# Patient Record
Sex: MALE | Race: OTHER | Employment: FULL TIME | URBAN - METROPOLITAN AREA
[De-identification: names, ages, dates, MRNs, and addresses within clinical notes are randomized per-mention and may not be internally consistent; named-entity substitution may affect disease eponyms.]

---

## 2017-01-04 ENCOUNTER — OFFICE VISIT (OUTPATIENT)
Dept: ORTHOPEDIC SURGERY | Age: 37
End: 2017-01-04

## 2017-01-04 VITALS
HEART RATE: 88 BPM | DIASTOLIC BLOOD PRESSURE: 80 MMHG | WEIGHT: 147.05 LBS | BODY MASS INDEX: 22.29 KG/M2 | SYSTOLIC BLOOD PRESSURE: 117 MMHG | HEIGHT: 68 IN

## 2017-01-04 DIAGNOSIS — Z98.890 S/P ACL RECONSTRUCTION: Primary | ICD-10-CM

## 2017-01-04 DIAGNOSIS — S83.511D RUPTURE OF ANTERIOR CRUCIATE LIGAMENT OF RIGHT KNEE, SUBSEQUENT ENCOUNTER: ICD-10-CM

## 2017-01-04 DIAGNOSIS — M54.89 BACK PAIN WITHOUT SCIATICA: ICD-10-CM

## 2017-01-04 PROCEDURE — 99024 POSTOP FOLLOW-UP VISIT: CPT | Performed by: ORTHOPAEDIC SURGERY

## 2017-01-06 ENCOUNTER — HOSPITAL ENCOUNTER (OUTPATIENT)
Dept: PHYSICAL THERAPY | Age: 37
Discharge: HOME | End: 2017-01-06
Admitting: ORTHOPAEDIC SURGERY

## 2017-01-18 ENCOUNTER — HOSPITAL ENCOUNTER (OUTPATIENT)
Dept: PHYSICAL THERAPY | Age: 37
Discharge: HOME | End: 2017-01-18
Admitting: ORTHOPAEDIC SURGERY

## 2017-01-25 ENCOUNTER — HOSPITAL ENCOUNTER (OUTPATIENT)
Dept: PHYSICAL THERAPY | Age: 37
Discharge: HOME | End: 2017-01-25
Admitting: ORTHOPAEDIC SURGERY

## 2017-03-21 ENCOUNTER — OFFICE VISIT (OUTPATIENT)
Dept: ORTHOPEDIC SURGERY | Age: 37
End: 2017-03-21

## 2017-03-21 VITALS
DIASTOLIC BLOOD PRESSURE: 86 MMHG | HEIGHT: 68 IN | BODY MASS INDEX: 22.29 KG/M2 | SYSTOLIC BLOOD PRESSURE: 138 MMHG | WEIGHT: 147.05 LBS | HEART RATE: 88 BPM

## 2017-03-21 DIAGNOSIS — S83.511D RUPTURE OF ANTERIOR CRUCIATE LIGAMENT OF RIGHT KNEE, SUBSEQUENT ENCOUNTER: Primary | ICD-10-CM

## 2017-03-21 PROCEDURE — 99213 OFFICE O/P EST LOW 20 MIN: CPT | Performed by: ORTHOPAEDIC SURGERY

## 2017-05-24 ENCOUNTER — OFFICE VISIT (OUTPATIENT)
Dept: ORTHOPEDIC SURGERY | Age: 37
End: 2017-05-24

## 2017-05-24 VITALS
WEIGHT: 147.05 LBS | DIASTOLIC BLOOD PRESSURE: 82 MMHG | HEIGHT: 68 IN | HEART RATE: 85 BPM | BODY MASS INDEX: 22.29 KG/M2 | SYSTOLIC BLOOD PRESSURE: 112 MMHG

## 2017-05-24 DIAGNOSIS — S83.511D RUPTURE OF ANTERIOR CRUCIATE LIGAMENT OF RIGHT KNEE, SUBSEQUENT ENCOUNTER: Primary | ICD-10-CM

## 2017-05-24 PROCEDURE — 73560 X-RAY EXAM OF KNEE 1 OR 2: CPT | Performed by: ORTHOPAEDIC SURGERY

## 2017-05-24 PROCEDURE — 99213 OFFICE O/P EST LOW 20 MIN: CPT | Performed by: ORTHOPAEDIC SURGERY

## 2017-08-23 ENCOUNTER — OFFICE VISIT (OUTPATIENT)
Dept: ORTHOPEDIC SURGERY | Age: 37
End: 2017-08-23

## 2017-08-23 VITALS
BODY MASS INDEX: 22.43 KG/M2 | WEIGHT: 148 LBS | DIASTOLIC BLOOD PRESSURE: 84 MMHG | HEART RATE: 97 BPM | SYSTOLIC BLOOD PRESSURE: 131 MMHG | HEIGHT: 68 IN

## 2017-08-23 DIAGNOSIS — S83.511D RUPTURE OF ANTERIOR CRUCIATE LIGAMENT OF RIGHT KNEE, SUBSEQUENT ENCOUNTER: ICD-10-CM

## 2017-08-23 DIAGNOSIS — Z98.890 S/P ACL RECONSTRUCTION: Primary | ICD-10-CM

## 2017-08-23 DIAGNOSIS — M17.10 PATELLOFEMORAL ARTHRITIS: ICD-10-CM

## 2017-08-23 PROCEDURE — 99213 OFFICE O/P EST LOW 20 MIN: CPT | Performed by: ORTHOPAEDIC SURGERY

## 2017-08-24 ENCOUNTER — HOSPITAL ENCOUNTER (OUTPATIENT)
Dept: PHYSICAL THERAPY | Age: 37
Discharge: HOME | End: 2017-08-24
Admitting: ORTHOPAEDIC SURGERY

## 2017-08-31 ENCOUNTER — HOSPITAL ENCOUNTER (OUTPATIENT)
Dept: PHYSICAL THERAPY | Age: 37
Discharge: HOME | End: 2017-08-31
Admitting: ORTHOPAEDIC SURGERY

## 2017-09-19 ENCOUNTER — HOSPITAL ENCOUNTER (OUTPATIENT)
Dept: PHYSICAL THERAPY | Age: 37
Discharge: HOME | End: 2017-09-19
Admitting: ORTHOPAEDIC SURGERY

## 2017-09-25 ENCOUNTER — HOSPITAL ENCOUNTER (OUTPATIENT)
Dept: PHYSICAL THERAPY | Age: 37
Discharge: HOME | End: 2017-09-25
Admitting: ORTHOPAEDIC SURGERY

## 2017-10-10 ENCOUNTER — HOSPITAL ENCOUNTER (OUTPATIENT)
Dept: PHYSICAL THERAPY | Age: 37
Discharge: HOME | End: 2017-10-10
Admitting: ORTHOPAEDIC SURGERY

## 2017-10-10 NOTE — FLOWSHEET NOTE
[address], [address] [place] [address]  Phone: [phone]   Fax:     [phone]        Physical Therapy Daily Treatment Note  Date:  10/10/2017    Patient Name:  Karlo Angulo    :  1980  MRN: [id number]  Restrictions/Precautions:    Medical/Treatment Diagnosis Information:  Diagnosis: S/P ACL reconstruction - Z98.890, Patellofemoral arthritis - M17.10, Rupture of ACL of R knee - S98.511D  Treatment Diagnosis: Decreased strength;Decreased endurance;Decreased high-level IADLs; Insurance/Certification information:  PT Insurance Information: [facility]  Physician Information:  Referring Practitioner: Dr. Garland Teixeira of care signed (Y/N): Y    Date of Patient follow up with Physician:     G-Code (if applicable):      Date G-Code Applied:  17  PT G-Codes  Functional Assessment Tool Used: LEFs  Score: 41% limitaiton  Functional Limitation: Mobility: Walking and moving around  Mobility: Walking and Moving Around Current Status (): At least 40 percent but less than 60 percent impaired, limited or restricted  Mobility: Walking and Moving Around Goal Status (): 0 percent impaired, limited or restricted     Progress Note: []  Yes  [x]  No  Next due by:        Latex Allergy:  [x]NO      []YES  Preferred Language for Healthcare:   [x]English       []other:    Visit # Insurance Allowable   5 UNL     Pain level: 0 /10 10/10    SUBJECTIVE:  He has started to do more workouts in the gym. He states he is doing better. He states his pain has been better when he does his exercises.    10/10     OBJECTIVE:    Observation:   Test measurements:          ROM AROM Comment     L R     Flexion 141 136     Extension 0 0         Strength L R Comment   Quad 4+/5 4-/5 + pain     Hamstring 4+/5 4+/5 + pain                Hip  flexion 4+/5 4+/5     Hip abd 4+/5 4/5                             Functional Mobility/Transfers: Independent      Posture: WFL     Gait: (include devices/WB status) WFL       RESTRICTIONS/PRECAUTIONS: None     Exercises/Interventions:   Exercise/Equipment Resistance/Repetitions Other comments   Bike Stretching     Hamstring 30 sec x 3 bilaterally  Added 9/19   Child's pose 10 sec x 10 Added 9/19   Inclined Calf     Hip Flexion     ITB     Groin     Quad          SLR     Supine 10 x3 #7.5 Started 8/24   Abduction 10 x3 #7.5 Started 8/24   Adduction 10 x3 #7.5 Started 8/24   Prone 10 x3 #7.5 Started 8/24   SLR+ 30 secx  5 #1. 5 Started 8/24   Clamshells 10 x 3 #7.5 ^9/19   Bridges            CKC     Calf raises     Wall sits     Step ups 10 x 3 L2 Added 9/19   1 leg stand     Squatting     CC TKE Balance     bridges     TRX squats  SL anterior heel taps 10 x 3 Added 9/25   Band walks  20' x 6 blue TB  ,Added 10/10        PRE     Extension  RANGE:   Flexion  RANGE:        Quantum machines     Leg press - SL  10 x 3 #80 ^10/10   Leg extension     Leg curl - SL  10 x 3 #35 Started 8/24        Manual interventions               Therapeutic Exercise and NMR EXR  [x] (27003) Provided verbal/tactile cueing for activities related to strengthening, flexibility, endurance, ROM for improvements in LE, proximal hip, and core control with self care, mobility, lifting, ambulation.  [] (31792) Provided verbal/tactile cueing for activities related to improving balance, coordination, kinesthetic sense, posture, motor skill, proprioception  to assist with LE, proximal hip, and core control in self care, mobility, lifting, ambulation and eccentric single leg control.      NMR and Therapeutic Activities:    [x] (12236 or 45989) Provided verbal/tactile cueing for activities related to improving balance, coordination, kinesthetic sense, posture, motor skill, proprioception and motor activation to allow for proper function of core, proximal hip and LE with self care and ADLs  [] (87784) Gait Re-education- Provided training and instruction to the patient for proper LE, core and proximal hip recruitment and positioning and eccentric body weight control with ambulation re-education including up and down stairs     Home Exercise Program:    [x] (06469) Reviewed/Progressed HEP activities related to strengthening, flexibility, endurance, ROM of core, proximal hip and LE for functional self-care, mobility, lifting and ambulation/stair navigation   [] (56221)Reviewed/Progressed HEP activities related to improving balance, coordination, kinesthetic sense, posture, motor skill, proprioception of core, proximal hip and LE for self care, mobility, lifting, and ambulation/stair navigation      Manual Treatments:  PROM / STM / Oscillations-Mobs:  G-I, II, III, IV (PA's, Inf., Post.)  [] (03105) Provided manual therapy to mobilize LE, proximal hip and/or LS spine soft tissue/joints for the purpose of modulating pain, promoting relaxation,  increasing ROM, reducing/eliminating soft tissue swelling/inflammation/restriction, improving soft tissue extensibility and allowing for proper ROM for normal function with self care, mobility, lifting and ambulation. Modalities: declined 9/19    Charges:  Timed Code Treatment Minutes: 45'    Total Treatment Minutes: 11:35 - 12:35 (60')        [] EVAL (LOW) 44085 (typically 20 minutes face-to-face)  [] EVAL (MOD) 10207 (typically 30 minutes face-to-face)  [] EVAL (HIGH) 34716 (typically 45 minutes face-to-face)  [] RE-EVAL   [x] EA(62782) x  1   [] IONTO  [x] NMR (85241) x  1   [] VASO  [] Manual (70943) x       [] Other:  [x] TA x  1    [] Mech Traction (51041)  [] ES(attended) (06184)      [] ES (un) (62387):     GOALS:  9/25  Patient stated goal: able to play soccer (rec) - Not MET    Therapist goals for Patient:   Short Term Goals: To be achieved in: 2 weeks  1. Independent in HEP and progression per patient tolerance, in order to prevent re-injury. - MET  2.  Patient will have a decrease in pain to facilitate improvement in movement, function, and ADLs as indicated by Functional Deficits. - MET    Long Term Goals: To be achieved in: 6-8 weeks  1. Pt will demonstrate greater than or equal to 50% improvement on LEFS to assist with reaching prior level of function. - Not MET  2. Patient will demonstrate increased right knee flex ROM to greater than or equal to 140 deg to allow for proper joint functioning as indicated by patients Functional Deficits. - Not MET  3. Patient will demonstrate an increase in right hip (flex and ABD) and knee (flex and ext) strength to 4+/5  to allow for proper functional mobility as indicated by patients Functional Deficits. -  Progressing towards  4. Patient will return to ambulating stairs without increased symptoms or restriction.  - Not MET  5. Patient will return to soccer pain free with proper form of cutting.  - Not MET    Progression Towards Functional goals:  [x] Patient is progressing as expected towards functional goals listed. [] Progression is slowed due to complexities listed. [] Progression has been slowed due to co-morbidities. [] Plan just implemented, too soon to assess goals progression  [] Other:     ASSESSMENT:  Pt sara session well. He continues to demonstrate quad fatigue, noted by shakiness with exercises. He sara the addition of band walks, reporting muscular fatigue. 10/10    Treatment/Activity Tolerance:  [x] Patient tolerated treatment well [] Patient limited by fatique  [] Patient limited by pain  [] Patient limited by other medical complications  [] Other:     Patient education: Patient education on PT and plan of care including diagnosis, prognosis, treatment goals and options. Patient agrees with discussed POC and treatment and is aware of rehab process.  Pt was also educated on clinic layout and use of modalities 8/24    Prognosis: [x] Good [] Fair  [] Poor    Patient Requires Follow-up: [x] Yes  [] No    PLAN: 1x/ week for 4 - 6 weeks (9/25 -

## 2017-10-25 ENCOUNTER — OFFICE VISIT (OUTPATIENT)
Dept: ORTHOPEDIC SURGERY | Age: 37
End: 2017-10-25

## 2017-10-25 VITALS
SYSTOLIC BLOOD PRESSURE: 133 MMHG | BODY MASS INDEX: 22.28 KG/M2 | DIASTOLIC BLOOD PRESSURE: 82 MMHG | WEIGHT: 147 LBS | HEART RATE: 82 BPM | HEIGHT: 68 IN

## 2017-10-25 DIAGNOSIS — Z98.890 S/P ACL RECONSTRUCTION: ICD-10-CM

## 2017-10-25 DIAGNOSIS — S83.511D RUPTURE OF ANTERIOR CRUCIATE LIGAMENT OF RIGHT KNEE, SUBSEQUENT ENCOUNTER: Primary | ICD-10-CM

## 2017-10-25 PROCEDURE — G8420 CALC BMI NORM PARAMETERS: HCPCS | Performed by: ORTHOPAEDIC SURGERY

## 2017-10-25 PROCEDURE — 99213 OFFICE O/P EST LOW 20 MIN: CPT | Performed by: ORTHOPAEDIC SURGERY

## 2017-10-25 PROCEDURE — G8427 DOCREV CUR MEDS BY ELIG CLIN: HCPCS | Performed by: ORTHOPAEDIC SURGERY

## 2017-10-25 PROCEDURE — 1036F TOBACCO NON-USER: CPT | Performed by: ORTHOPAEDIC SURGERY

## 2017-10-25 PROCEDURE — G8484 FLU IMMUNIZE NO ADMIN: HCPCS | Performed by: ORTHOPAEDIC SURGERY

## 2017-10-25 ASSESSMENT — ENCOUNTER SYMPTOMS: BACK PAIN: 1

## 2017-10-25 NOTE — PROGRESS NOTES
objective assessment of his strength with a Biodex, and then see where he is at so we can make some specific recommendations on how to proceed. We did not think he is quite ready for any sport activities like soccer with cutting and pivoting, especially because he does not feel comfortable with this. We will see him back after the completion of the bBiodex      France Marie MD  Fellow - [address] [facility]  [phone]

## 2017-10-25 NOTE — PROGRESS NOTES
Review of Systems   Cardiovascular: Positive for palpitations. Musculoskeletal: Positive for back pain and joint pain.         R knee pain

## 2017-10-26 NOTE — PROGRESS NOTES
Chief complaint: Leg weakness    HPI:Almost 11 months status post right knee ACL reconstruction with allograft. He is still working on physical therapy one time every 2 weeks. He states he is going to the gym and working on his quadriceps strength to her 3 times weekly. He feels like his strength is slowly coming along but not at the point that he would want to buy now. He denies any feelings of instability. He denies any significant pain. He is continuing to get workup for his known arrhythmias, but presented approximately 2 months postoperatively. This significantly interfered with his rehab early on, it has set him behind ball. Patient's review of systems was reviewed with him today. Only changes are that he has had recent cardiac arrhythmias which are being treated. Pain Assessment  Location of Pain: Knee  Location Modifiers: Right  Severity of Pain: 0  Result of Injury: Yes  Work-Related Injury: No  Are there other pain locations you wish to document?: No    Past Medical History:   Diagnosis Date    Anterior cruciate ligament tear     right    Palpitations         Past Surgical History:   Procedure Laterality Date    KNEE SURGERY Right 11/10/2016    RIGHT KNEE ARTHROSCOPIC ANTERIOR CRUCIATE LIGAMENT       History reviewed. No pertinent family history.     Social History     Social History    Marital status:      Spouse name: N/A    Number of children: N/A    Years of education: N/A     Social History Main Topics    Smoking status: Never Smoker    Smokeless tobacco: Never Used    Alcohol use 0.6 oz/week     1 Shots of liquor per week    Drug use: No    Sexual activity: Not Asked     Other Topics Concern    None     Social History Narrative    None       Current Outpatient Prescriptions   Medication Sig Dispense Refill    diltiazem (CARDIZEM) 30 MG tablet TAKE 1 TABLET BY MOUTH EVERY DAY AS NEEDED  3    diclofenac (VOLTAREN) 75 MG EC tablet Take 1 tablet by mouth 2 times daily 1 PO Q BID WITH FOOD 60 tablet 2    oxyCODONE-acetaminophen (PERCOCET) 5-325 MG per tablet        No current facility-administered medications for this visit. Allergies   Allergen Reactions    Sulfa Antibiotics        Vital signs:  /82   Pulse 82   Ht 5' 8\" (1.727 m)   Wt 147 lb (66.7 kg)   BMI 22.35 kg/m²        Neuro: Alert & oriented x 3,  normal,  no focal deficits noted. Normal affect. Eyes: sclera clear  Ears: Normal external ear  Mouth:  No perioral lesions  Pulm: Respirations unlabored and regular  Pulse: Regular rate and rhythm   Skin: Warm, well perfused        Left Knee Exam:       Gait/Alignment: No use of assistive devices. No antalgic gait or limp. Normal alignment. Patella tracking: Normal tracking identified. No retinacular tenderness. Negative J sign. Inspection/Skin: Skin is intact without erythema, ecchymosis, or swelling. Effusion; None. Palpation:  Nontender. No crepitus. Range of Motion:   0° of extension to 125° of flexion. Strength: 5/5 quadriceps strength. Ligamentous Stability: Stable to valgus and varus testing at 0° and 30°. Negative Lachman exam.  Negative posterior drawer. Neurologic and vascular: Skin is warm and well-perfused. Additional findings: Calf soft nontender             Right knee exam shows normal alignment. He has regained complete range of motion compared to contralateral side. He is nontender throughout. He has no significant patellofemoral crepitance and no pain with patellofemoral compression. His knee is stable to AP and varus and valgus stresses. He has notable quadriceps atrophy. He has 45 strength compared to contralateral side. His hamstring strength is closer to that on the left than his extension strength. Impression: Right knee ACL reconstruction with allograft, with routine healing, but delayed shrink the recovery    Plan: The patient has a lot of strength still to regain.   We are going to get an objective assessment of his strength with a Biodex, and then see where he is at so we can make some specific recommendations on how to proceed. We did not think he is quite ready for any sport activities like soccer with cutting and pivoting, especially because he does not feel comfortable with this. We will see him back after the completion of the bBiodex      Mihaela Watkins MD  Fellow - [address] [facility]  [phone]            I supervised my sports medicine fellow in the evaluation and development of a treatment plan  for this patient. I personally interviewed the patient and performed a physical examination. In addition, I discussed the patient's condition and treatment options with them. All of their questions were answered. I personally reviewed the patient's pain scale, review of systems, family history, social history, past medical history, allergies and medications. 13 point review of systems was collected today and is filed in the medical record. Greater than 50% of the visit was spent counseling the patient. Judi Loving MD  Sports Medicine, Arthroscopic Knee and Shoulder Surgery    This dictation was performed with a verbal recognition program [facility]) and it was checked for errors. It is possible that there are still dictated errors within this office note. If so, please bring any errors to my attention for an addendum. All efforts were made to ensure that this office note is accurate.

## 2017-10-27 ENCOUNTER — OFFICE VISIT (OUTPATIENT)
Dept: ORTHOPEDIC SURGERY | Age: 37
End: 2017-10-27

## 2017-10-27 ENCOUNTER — HOSPITAL ENCOUNTER (OUTPATIENT)
Dept: PHYSICAL THERAPY | Age: 37
Discharge: HOME | End: 2017-10-27
Admitting: ORTHOPAEDIC SURGERY

## 2017-10-27 VITALS
SYSTOLIC BLOOD PRESSURE: 124 MMHG | WEIGHT: 147 LBS | BODY MASS INDEX: 22.28 KG/M2 | HEIGHT: 68 IN | DIASTOLIC BLOOD PRESSURE: 79 MMHG | HEART RATE: 100 BPM

## 2017-10-27 DIAGNOSIS — Z98.890 S/P ACL RECONSTRUCTION: Primary | ICD-10-CM

## 2017-10-27 PROCEDURE — G8484 FLU IMMUNIZE NO ADMIN: HCPCS | Performed by: ORTHOPAEDIC SURGERY

## 2017-10-27 PROCEDURE — 99213 OFFICE O/P EST LOW 20 MIN: CPT | Performed by: ORTHOPAEDIC SURGERY

## 2017-10-27 PROCEDURE — 1036F TOBACCO NON-USER: CPT | Performed by: ORTHOPAEDIC SURGERY

## 2017-10-27 PROCEDURE — G8420 CALC BMI NORM PARAMETERS: HCPCS | Performed by: ORTHOPAEDIC SURGERY

## 2017-10-27 PROCEDURE — G8427 DOCREV CUR MEDS BY ELIG CLIN: HCPCS | Performed by: ORTHOPAEDIC SURGERY

## 2017-10-27 NOTE — PROGRESS NOTES
Review of Systems   Cardiovascular: Positive for palpitations. Musculoskeletal: Positive for joint pain.         R knee pain

## 2017-11-01 ENCOUNTER — HOSPITAL ENCOUNTER (OUTPATIENT)
Dept: PHYSICAL THERAPY | Age: 37
Discharge: HOME | End: 2017-11-30
Attending: ORTHOPAEDIC SURGERY | Admitting: ORTHOPAEDIC SURGERY

## 2017-11-03 ENCOUNTER — HOSPITAL ENCOUNTER (OUTPATIENT)
Dept: PHYSICAL THERAPY | Age: 37
Discharge: HOME | End: 2017-11-03
Admitting: ORTHOPAEDIC SURGERY

## 2017-11-03 NOTE — FLOWSHEET NOTE
instruction to the patient for proper LE, core and proximal hip recruitment and positioning and eccentric body weight control with ambulation re-education including up and down stairs     Home Exercise Program:    [x] (33639) Reviewed/Progressed HEP activities related to strengthening, flexibility, endurance, ROM of core, proximal hip and LE for functional self-care, mobility, lifting and ambulation/stair navigation   [] (37917)Reviewed/Progressed HEP activities related to improving balance, coordination, kinesthetic sense, posture, motor skill, proprioception of core, proximal hip and LE for self care, mobility, lifting, and ambulation/stair navigation      Manual Treatments:  PROM / STM / Oscillations-Mobs:  G-I, II, III, IV (PA's, Inf., Post.)  [] (90515) Provided manual therapy to mobilize LE, proximal hip and/or LS spine soft tissue/joints for the purpose of modulating pain, promoting relaxation,  increasing ROM, reducing/eliminating soft tissue swelling/inflammation/restriction, improving soft tissue extensibility and allowing for proper ROM for normal function with self care, mobility, lifting and ambulation. Modalities: declined 9/19    Charges:  Timed Code Treatment Minutes: 45'    Total Treatment Minutes: 11:50 - 12:50 (60')        [] EVAL (LOW) 35215 (typically 20 minutes face-to-face)  [] EVAL (MOD) 85288 (typically 30 minutes face-to-face)  [] EVAL (HIGH) 95251 (typically 45 minutes face-to-face)  [] RE-EVAL   [x] JF(62849) x  1   [] IONTO  [x] NMR (69559) x  1   [] VASO  [] Manual (02088) x       [] Other:  [x] TA x  1    [] Mech Traction (42970)  [] ES(attended) (64005)      [] ES (un) (69796):     GOALS:  9/25  Patient stated goal: able to play soccer (rec) - Not MET    Therapist goals for Patient:   Short Term Goals: To be achieved in: 2 weeks  1. Independent in HEP and progression per patient tolerance, in order to prevent re-injury. - MET  2.  Patient will have a decrease in pain to facilitate improvement in movement, function, and ADLs as indicated by Functional Deficits. - MET    Long Term Goals: To be achieved in: 6-8 weeks  1. Pt will demonstrate greater than or equal to 50% improvement on LEFS to assist with reaching prior level of function. - Not MET  2. Patient will demonstrate increased right knee flex ROM to greater than or equal to 140 deg to allow for proper joint functioning as indicated by patients Functional Deficits. - Not MET  3. Patient will demonstrate an increase in right hip (flex and ABD) and knee (flex and ext) strength to 4+/5  to allow for proper functional mobility as indicated by patients Functional Deficits. -  Progressing towards  4. Patient will return to ambulating stairs without increased symptoms or restriction.  - Not MET  5. Patient will return to soccer pain free with proper form of cutting.  - Not MET    Progression Towards Functional goals:  [x] Patient is progressing as expected towards functional goals listed. [] Progression is slowed due to complexities listed. [] Progression has been slowed due to co-morbidities. [] Plan just implemented, too soon to assess goals progression  [] Other:     ASSESSMENT:  Pt sara session well. He demonstrates extreme quad weakness and atrophy. The patient lacks eccentric quad control and was not able to perform SL dips without B UE support after doing eccentric knee flexion. The patient required verbal cues for lunges and was not able to perform with R LE in back foot. Recommend to perform functional exercises prior to PRE workout. 11/3    Treatment/Activity Tolerance:  [x] Patient tolerated treatment well [] Patient limited by fatique  [] Patient limited by pain  [] Patient limited by other medical complications  [] Other:     Patient education: Patient education on PT and plan of care including diagnosis, prognosis, treatment goals and options. Patient agrees with discussed POC and treatment and is aware of rehab process.  Pt

## 2017-12-01 ENCOUNTER — HOSPITAL ENCOUNTER (OUTPATIENT)
Dept: PHYSICAL THERAPY | Age: 37
Discharge: HOME | End: 2017-12-31
Attending: ORTHOPAEDIC SURGERY | Admitting: ORTHOPAEDIC SURGERY

## 2017-12-05 ENCOUNTER — HOSPITAL ENCOUNTER (OUTPATIENT)
Dept: PHYSICAL THERAPY | Age: 37
Discharge: HOME | End: 2017-12-05
Admitting: ORTHOPAEDIC SURGERY

## 2017-12-12 ENCOUNTER — HOSPITAL ENCOUNTER (OUTPATIENT)
Dept: PHYSICAL THERAPY | Age: 37
Discharge: HOME | End: 2017-12-12
Admitting: ORTHOPAEDIC SURGERY

## 2017-12-12 NOTE — FLOWSHEET NOTE
degrees      Assessed, but muscle tightness    [x]no symptoms distal to the knee    [x]no red flags and minimal yellow flags    [x]no contraindications to manipulation             ROM AROM Comment     L R     Flexion 141 138     Extension 0 0         Strength L R Comment   Quad 4+/5 4+/5     Hamstring 4+/5 4+/5                 Hip  flexion 4+/5 4+/5     Hip abd 4+/5 4+/5                             Functional Mobility/Transfers: Squatting form only allowed pt to reach about 45 deg of hip flexion. Pt exhibited decreased weight acceptance on R LE and increased knee valgus. Transfers are independent.      Posture: WFL     Gait: (include devices/WB status) WFL       RESTRICTIONS/PRECAUTIONS: None     Exercises/Interventions:   Exercise/Equipment Resistance/Repetitions Other comments   Bike 5' Stretching     Hamstring 30 sec x 3 bilaterally  Added 9/19   Child's pose  Added 9/19   Inclined Calf 10 x 10 sec Added 11/3   Hip Flexion     ITB     Groin     Quad          SLR     Supine ^ 11/3   Abduction ^ 11/3   Adduction ^ 11/3   Prone Started 8/24   Started 8/24   Clamshells  ^ 11/3   Bridges            CKC     Calf raises     Marie Heidi sits     Step ups 10 x 3 L3 lateral ^ 11/3   1 leg stand     Squatting     CC TKE Balance     bridges     TRX squats  10 x 3SL anterior heel taps 10 x 3 TRX Added 9/25   Band walks   ^ 11/3   Lunges  Added 11/3   PRE     Extension  RANGE:   Flexion  RANGE:        Quantum machines     Leg press - SL  10 x 3 #80 ^10/10   Leg extension 10 x 3 #20 BL   10 x 3 #35 BL conc. SL ecc.    Added 11/3   Leg curl - SL  10 x 3 #55 ^ 11/3        Manual interventions               Therapeutic Exercise and NMR EXR  [x] (23404) Provided verbal/tactile cueing for activities related to strengthening, flexibility, endurance, ROM for improvements in LE, proximal hip, and core control with self care, mobility, lifting, ambulation.  [] (90942) Provided verbal/tactile cueing for activities related to improving balance, EVAL (MOD) 14342 (typically 30 minutes face-to-face)  [] EVAL (HIGH) 60809 (typically 45 minutes face-to-face)  [] RE-EVAL   [x] JV(41400) x  1   [] IONTO  [x] NMR (50199) x  1   [] VASO  [x] Manual (04041) x  1    [] Other:  [] TA x       [] Mech Traction (80787)  [] ES(attended) (69715)      [] ES (un) (00950):     GOALS:  12/5  Patient stated goal: able to play soccer (rec) - Not MET    Therapist goals for Patient:   Short Term Goals: To be achieved in: 2 weeks  1. Independent in HEP and progression per patient tolerance, in order to prevent re-injury. - MET  2. Patient will have a decrease in pain to facilitate improvement in movement, function, and ADLs as indicated by Functional Deficits. - MET    Long Term Goals: To be achieved in: 6-8 weeks  1. Pt will demonstrate greater than or equal to 50% improvement on LEFS to assist with reaching prior level of function. - Not MET  2. Patient will demonstrate increased right knee flex ROM to greater than or equal to 140 deg to allow for proper joint functioning as indicated by patients Functional Deficits. - Not MET  3. Patient will demonstrate an increase in right hip (flex and ABD) and knee (flex and ext) strength to 4+/5  to allow for proper functional mobility as indicated by patients Functional Deficits. -  Met  4. Patient will return to ambulating stairs without increased symptoms or restriction.  - Not MET  5. Patient will return to soccer pain free with proper form of cutting.  - Not MET    Progression Towards Functional goals:  [x] Patient is progressing as expected towards functional goals listed. [] Progression is slowed due to complexities listed. [] Progression has been slowed due to co-morbidities. [] Plan just implemented, too soon to assess goals progression  [] Other:     ASSESSMENT:   The patients back pain was assessed and criteria was met for lumbar mobilization category.  After assessment and treatment of the back the patient was able to

## 2017-12-15 ENCOUNTER — HOSPITAL ENCOUNTER (OUTPATIENT)
Dept: PHYSICAL THERAPY | Age: 37
Discharge: HOME | End: 2017-12-31
Admitting: INTERNAL MEDICINE

## 2017-12-15 NOTE — FLOWSHEET NOTE
lay in a normal bed    Therapist goals for Patient:   Short Term Goals: To be achieved in: 2 weeks  1. Independent in HEP and progression per patient tolerance, in order to prevent re-injury. 2. Patient will have a decrease in pain to facilitate improvement in movement, function, and ADLs as indicated by Functional Deficits. Long Term Goals: To be achieved in: 4-6 weeks  1. Disability index score of 20% or less for the [place] to assist with reaching prior level of function in 6 weeks. 2. Patient will demonstrate increased AROM to WNL, good LS mobility, good hip ROM to allow for proper joint functioning as indicated by patients Functional Deficits in 4 weeks. 3. Patient will demonstrate an increase in Strength to good proximal hip and core activation to allow for proper functional mobility as indicated by patients Functional Deficits in 6 weeks. 4. Patient will return to independent functional activities without increased symptoms or restriction in 6 weeks. Progression Towards Functional goals:  [] Patient is progressing as expected towards functional goals listed. [] Progression is slowed due to complexities listed. [] Progression has been slowed due to co-morbidities.   [x] Plan just implemented, too soon to assess goals progression  [] Other:     ASSESSMENT:  See eval    Treatment/Activity Tolerance:  [x] Patient tolerated treatment well [] Patient limited by fatique  [] Patient limited by pain  [] Patient limited by other medical complications  [x] Other: 12/15 Good tolerance to manual. Reported feeling better after manipulation    Patient education:  12/15 HEP provided and reviewed with patient     Prognosis: [x] Good [] Fair  [] Poor    Patient Requires Follow-up: [x] Yes  [] No    PLAN: See eval  [] Continue per plan of care [] Alter current plan (see comments)  [x] Plan of care initiated [] Hold pending MD visit [] Discharge    Electronically signed by: Day Castrejon PT, DPT

## 2017-12-15 NOTE — PLAN OF CARE
other red flags. Relevant Medical History: R ACL surgery Nov 10, 2016  Functional Disability Index/G-Codes:  PT G-Codes  Functional Assessment Tool Used: Shalini  Score: 52%  Functional Limitation: Changing and maintaining body position  Changing and Maintaining Body Position Current Status (): At least 40 percent but less than 60 percent impaired, limited or restricted  Changing and Maintaining Body Position Goal Status ():  At least 1 percent but less than 20 percent impaired, limited or restricted    Pain Scale: 0-6/10  Easing factors: sleeping on left side; manipulation  Provocative factors: lifting and carrying heavy objects; sleeping on right side; sleeping on soft surface; sitting for long periods of time; bending over for extended period of time    Type: [x]Constant   []Intermittent  []Radiating []Localized []other:     Numbness/Tingling: Denies    Occupation/School:     Living Status/Prior Level of Function: Independent with ADLs and IADLs; playing soccer    OBJECTIVE:   ROM 12/15  Comments   Trunk flexion Mid Tibia bilateral    Trunk extension WNL Pain present   Trunk R sidebend WNL    Trunk L sidebend WNL    Trunk R rotation WNL    Trunk L rotation WNL    HS flexibility 20 degrees from vertical bilaterally                Strength  12/15 Left Right Comments   Hip flexion(L2) 5 5    Knee extension(L3) 5 5    Knee flexion(S1-2) 5 5    Ankle dorsiflexion(L4) 5 5    Toe extension(L5) 5 5    Ankle eversion/plantar flexion(S1) 5 5      Special tests  12/15  Comments   SLR     Slump test     Pelvic symmetry  Equal standing and supine    Segmental Spinal mobility     Heel walk negative    Toe walk negative    Tandem walk negative    Forward flexion Test negative    Prone flexion R higher than L                DTRs  12/15 Left Right Comments   Patellar(L3-L4) 2+ 2+    Achilles(S1-S2) 2+ 2+            Joint mobility: 12/15   [x]Normal    []Hypo   []Hyper    Palpation: Tenderness L PSIS 12/15    Functional Mobility/Transfers: Independent with increased pain; unable to work out due to pain  12/15     Posture: Forward head and rounded shoulders  12/15    Gait: (include devices/WB status) WNL  12/15                       [x] Patient history, allergies, meds reviewed. Medical chart reviewed. See intake form. 12/15    Review Of Systems (ROS):  [x]Performed Review of systems (Integumentary, CardioPulmonary, Neurological) by intake and observation. Intake form has been scanned into medical record. Patient has been instructed to contact their primary care physician regarding ROS issues if not already being addressed at this time.  12/15     Co-morbidities/Complexities (which will affect course of rehabilitation):   [x]None           Arthritic conditions   []Rheumatoid arthritis (M05.9)  []Osteoarthritis (M19.91)   Cardiovascular conditions   []Hypertension (I10)  []Hyperlipidemia (E78.5)  []Angina pectoris (I20)  []Atherosclerosis (I70)   Musculoskeletal conditions   []Disc pathology   []Congenital spine pathologies   []Prior surgical intervention  []Osteoporosis (M81.8)  []Osteopenia (M85.8)   Endocrine conditions   []Hypothyroid (E03.9)  []Hyperthyroid Gastrointestinal conditions   []Constipation (L16.72)   Metabolic conditions   []Morbid obesity (E66.01)  []Diabetes type 1(E10.65) or 2 (E11.65)   []Neuropathy (G60.9)     Pulmonary conditions   []Asthma (J45)  []Coughing   []COPD (J44.9)   Psychological Disorders  []Anxiety (F41.9)  []Depression (F32.9)   []Other:   []Other:           Barriers to/and or personal factors that will affect rehab potential:              [x]Age  [x]Sex              [x]Motivation/Lack of Motivation                        []Co-Morbidities              []Cognitive Function, education/learning barriers              []Environmental, home barriers              []profession/work barriers  [x]past PT/medical experience  []other:  Justification: Patient is a healthy 44year old male tasks   []Reduced ability to squat   []Reduced ability to forward bend   [x]Reduced ability to ambulate prolonged functional periods/distances/surfaces   []Reduced ability to ascend/descend stairs   []other:       Participation Restrictions   []Reduced participation in self care activities   [x]Reduced participation in home management activities   [x]Reduced participation in work activities   []Reduced participation in social activities. [x]Reduced participation in sport/recreational activities. Classification:   []Signs/symptoms consistent with Lumbar instability/stabilization subgroup. [x]Signs/symptoms consistent with Lumbar mobilization/manipulation subgroup, myotomes and dermatomes intact. Meets manipulation criteria. []Signs/symptoms consistent with Lumbar direction specific/centralization subgroup   []Signs/symptoms consistent with Lumbar traction subgroup     []Signs/symptoms consistent with lumbar facet dysfunction   []Signs/symptoms consistent with lumbar stenosis type dysfunction   []Signs/symptoms consistent with nerve root involvement including myotome & dermatome dysfunction   []Signs/symptoms consistent with post-surgical status including: decreased ROM, strength and function.    []signs/symptoms consistent with pathology which may benefit from Dry needling     []other:      Prognosis/Rehab Potential:      []Excellent   [x]Good    []Fair   []Poor    Tolerance of evaluation/treatment:    []Excellent   [x]Good    []Fair   []Poor    Physical Therapy Evaluation Complexity Justification  [x] A history of present problem with:  [x] no personal factors and/or comorbidities that impact the plan of care;  []1-2 personal factors and/or comorbidities that impact the plan of care  []3 personal factors and/or comorbidities that impact the plan of care  [x] An examination of body systems using standardized tests and measures addressing any of the following: body structures and functions (impairments),

## 2017-12-19 ENCOUNTER — HOSPITAL ENCOUNTER (OUTPATIENT)
Dept: PHYSICAL THERAPY | Age: 37
Discharge: HOME | End: 2017-12-19
Admitting: ORTHOPAEDIC SURGERY

## 2017-12-19 NOTE — FLOWSHEET NOTE
cueing for activities related to improving balance, coordination, kinesthetic sense, posture, motor skill, proprioception  to assist with LE, proximal hip, and core control in self care, mobility, lifting, ambulation and eccentric single leg control. NMR and Therapeutic Activities:    [x] (57565 or 41632) Provided verbal/tactile cueing for activities related to improving balance, coordination, kinesthetic sense, posture, motor skill, proprioception and motor activation to allow for proper function of core, proximal hip and LE with self care and ADLs  [] (27417) Gait Re-education- Provided training and instruction to the patient for proper LE, core and proximal hip recruitment and positioning and eccentric body weight control with ambulation re-education including up and down stairs     Home Exercise Program:    [x] (60984) Reviewed/Progressed HEP activities related to strengthening, flexibility, endurance, ROM of core, proximal hip and LE for functional self-care, mobility, lifting and ambulation/stair navigation   [] (59670)Reviewed/Progressed HEP activities related to improving balance, coordination, kinesthetic sense, posture, motor skill, proprioception of core, proximal hip and LE for self care, mobility, lifting, and ambulation/stair navigation      Manual Treatments:  PROM / STM / Oscillations-Mobs:  G-I, II, III, IV (PA's, Inf., Post.)  [x] (72992) Provided manual therapy to mobilize LE, proximal hip and/or LS spine soft tissue/joints for the purpose of modulating pain, promoting relaxation,  increasing ROM, reducing/eliminating soft tissue swelling/inflammation/restriction, improving soft tissue extensibility and allowing for proper ROM for normal function with self care, mobility, lifting and ambulation.      2     Modalities:  12/5    Charges:  Timed Code Treatment Minutes: 54'    Total Treatment Minutes: 11:30 - 12:34 (64')        [] EVAL (LOW) 63174 (typically 20 minutes face-to-face)  [] EVAL (MOD)

## 2017-12-20 ENCOUNTER — HOSPITAL ENCOUNTER (OUTPATIENT)
Dept: PHYSICAL THERAPY | Age: 37
Discharge: HOME | End: 2017-12-20
Admitting: INTERNAL MEDICINE

## 2017-12-20 NOTE — FLOWSHEET NOTE
Independent with increased pain; unable to work out due to pain  12/15     Posture: Forward head and rounded shoulders  12/15    Gait: (include devices/WB status) WNL  12/15                       [x] Patient history, allergies, meds reviewed. Medical chart reviewed. See intake form. 12/15    RESTRICTIONS/PRECAUTIONS: R ACL Repair 2016    Exercises/Interventions: BILATERAL  ROM/stretches     SKTC     DKTC 30 sec x 5 Start 12/15   Prayer stretch     Supine HS 30 sec x 5 Start 12/15   Pelvic tilt     Piriformis (supine) 30 sec x 5 Start 12/20   Hook lying rotation 10 sec x 10 Start 12/15   Cat and camel 3 x 10 Start 12/20   Thoracic extension 20x Start 12/15   Strengthening     SLR     Quadruped alternate UE reaches 3 x 30 sec Start 12/20   Quadruped alternate LE reaches 3 x 30 sec Start 12/20   Quadruped alternate UE/LE reaches     yavalu heel raises     Sit ups      planks     Tband lat pulls     Tband rows         Manual Intervention             Prone PA      GISTM/STM      Lumbar Manip Rotation 5 min Start 12/15   SI Manip      Hip belt mobs      Hip LA distraction (L  Manip)  5 min Start 12/15           Therapeutic Exercise and NMR EXR  [x] (22278) Provided verbal/tactile cueing for activities related to strengthening, flexibility, endurance, ROM  for improvements in proximal hip and core control with self care, mobility, lifting and ambulation.  [] (14623) Provided verbal/tactile cueing for activities related to improving balance, coordination, kinesthetic sense, posture, motor skill, proprioception  to assist with core control in self care, mobility, lifting, and ambulation.      Therapeutic Activities:    [] (71761 or 89090) Provided verbal/tactile cueing for activities related to improving balance, coordination, kinesthetic sense, posture, motor skill, proprioception and motor activation to allow for proper function  with self care and ADLs  [] (03294) Provided training and instruction to the patient for proper core and proximal hip recruitment and positioning with ambulation re-education     Home Exercise Program:    [x] (18066) Reviewed/Progressed HEP activities related to strengthening, flexibility, endurance, ROM of core, proximal hip and LE for functional self-care, mobility, lifting and ambulation   [] (97150) Reviewed/Progressed HEP activities related to improving balance, coordination, kinesthetic sense, posture, motor skill, proprioception of core, proximal hip and LE for self care, mobility, lifting, and ambulation      Manual Treatments:  PROM / STM / Oscillations-Mobs:  G-I, II, III, IV (PA's, Inf., Post.)  [x] (74506) Provided manual therapy to mobilize proximal hip and LS spine soft tissue/joints for the purpose of modulating pain, promoting relaxation,  increasing ROM, reducing/eliminating soft tissue swelling/inflammation/restriction, improving soft tissue extensibility and allowing for proper ROM for normal function with self care, mobility, lifting and ambulation. Modalities:       Charges:  Timed Code Treatment Minutes: 45   Total Treatment Minutes: 55       [] EVAL (LOW) 70399 (typically 20 minutes face-to-face)  [x] EVAL (MOD) 44002 (typically 30 minutes face-to-face)  [] EVAL (HIGH) 16435 (typically 45 minutes face-to-face)  [] RE-EVAL     [x] OO(44723) x  2   [] IONTO  [] NMR (22531) x      [] VASO  [x] Manual (80794) x  1    [] Other:  [] TA x       [] Mech Traction (34046)  [] ES(attended) (87584)      [] ES (un) (11020):     Goals:  Patient stated goal: Reduce pain; Be able to sit and walk for long periods of time; lay in a normal bed    Therapist goals for Patient:   Short Term Goals: To be achieved in: 2 weeks  1. Independent in HEP and progression per patient tolerance, in order to prevent re-injury. 2. Patient will have a decrease in pain to facilitate improvement in movement, function, and ADLs as indicated by Functional Deficits. Long Term Goals:  To be achieved

## 2017-12-27 ENCOUNTER — HOSPITAL ENCOUNTER (OUTPATIENT)
Dept: PHYSICAL THERAPY | Age: 37
Discharge: HOME | End: 2017-12-27
Admitting: ORTHOPAEDIC SURGERY

## 2017-12-27 NOTE — FLOWSHEET NOTE
[facility], [facility] [address] [place] [address]  Phone: [phone]   Fax:     [phone]    Physical Therapy Daily Treatment Note  Date:  2017    Patient Name:  Josefina Tinajero    :  1980  MRN: [id number]  Restrictions/Precautions:    Medical/Treatment Diagnosis Information:  Diagnosis: M46.1- Sacroiliitis  Treatment Diagnosis: M45.5 Low Back Pain  Insurance/Certification information:  PT Insurance Information: [facility]  Physician Information:  Referring Practitioner: Selena Man MD  Plan of care signed (Y/N):     Date of Patient follow up with Physician:     G-Code (if applicable):      Date G-Code Applied: 12/15/17  [place]: 52%        Progress Note: []  Yes  []  No  Next due by: Visit #10  Or 1/15/18    Latex Allergy:  [x]NO      []YES  Preferred Language for Healthcare:   [x]English       []other:    Visit # Insurance Allowable   3  12/27 UNL     Pain level: 3/10 12/27    SUBJECTIVE:   Patient states that his back is bothering him less but he is having symptoms up higher in more of his thoracic area. He states that he is more stiff in the morning.      OBJECTIVE:   ROM 12/15  Comments   Trunk flexion Mid Tibia bilateral    Trunk extension WNL Pain present   Trunk R sidebend WNL    Trunk L sidebend WNL    Trunk R rotation WNL    Trunk L rotation WNL    HS flexibility 20 degrees from vertical bilaterally                Strength  12/15 Left Right Comments   Hip flexion(L2) 5 5    Knee extension(L3) 5 5    Knee flexion(S1-2) 5 5    Ankle dorsiflexion(L4) 5 5    Toe extension(L5) 5 5    Ankle eversion/plantar flexion(S1) 5 5      Special tests  /15  Comments   SLR     Slump test     Pelvic symmetry  Equal standing and supine    Segmental Spinal mobility     Heel walk negative    Toe walk negative    Tandem walk negative    Forward flexion Test negative    Prone flexion R higher than L                DTRs 12/15 Left Right Comments   Patellar(L3-L4) 2+ 2+    Achilles(S1-S2) 2+ 2+            Joint mobility: 12/15   [x]Normal    []Hypo   []Hyper    Palpation: Tenderness L PSIS   12/15    Functional Mobility/Transfers: Independent with increased pain; unable to work out due to pain  12/15     Posture: Forward head and rounded shoulders  12/15    Gait: (include devices/WB status) WNL  12/15                       [x] Patient history, allergies, meds reviewed. Medical chart reviewed. See intake form.    12/15    RESTRICTIONS/PRECAUTIONS: R ACL Repair 2016    Exercises/Interventions: BILATERAL  ROM/stretches     SKTC     DKTC 30 sec x 5 Start 12/15   Prayer stretch     Supine HS 30 sec x 5 Start 12/15   Pelvic tilt     Piriformis (supine) 30 sec x 5 Start 12/20   Hook lying rotation 10 sec x 10 Start 12/15   Cat and camel 3 x 10 Start 12/20   Thoracic extension 20x Start 12/15   Strengthening     SLR     Quadruped alternate UE reaches 3 x 30 sec Start 12/20   Quadruped alternate LE reaches 3 x 30 sec Start 12/20   Quadruped alternate UE/LE reaches     BoxCat heel raises     Sit ups      planks     Tband lat pulls     Tband rows     TA Iso 10 sec x 10 Start 12/27   TA Iso with ADD 10 sec x 10 Start 12/27   TA Iso with ABD 10 sec x 10 Start 12/27            Manual Intervention             Prone PA      GISTM/STM      Lumbar Manip Rotation 5 min Start 12/15   SI Manip      Hip belt mobs      Hip LA distraction (L  Manip)  5 min Start 12/15           Therapeutic Exercise and NMR EXR  [x] (28449) Provided verbal/tactile cueing for activities related to strengthening, flexibility, endurance, ROM  for improvements in proximal hip and core control with self care, mobility, lifting and ambulation.  [] (17514) Provided verbal/tactile cueing for activities related to improving balance, coordination, kinesthetic sense, posture, motor skill, proprioception  to assist with core control in self care, mobility, lifting, and ambulation. Therapeutic Activities:    [] (13357 or 03104) Provided verbal/tactile cueing for activities related to improving balance, coordination, kinesthetic sense, posture, motor skill, proprioception and motor activation to allow for proper function  with self care and ADLs  [] (58852) Provided training and instruction to the patient for proper core and proximal hip recruitment and positioning with ambulation re-education     Home Exercise Program:    [x] (33848) Reviewed/Progressed HEP activities related to strengthening, flexibility, endurance, ROM of core, proximal hip and LE for functional self-care, mobility, lifting and ambulation   [] (70174) Reviewed/Progressed HEP activities related to improving balance, coordination, kinesthetic sense, posture, motor skill, proprioception of core, proximal hip and LE for self care, mobility, lifting, and ambulation      Manual Treatments:  PROM / STM / Oscillations-Mobs:  G-I, II, III, IV (PA's, Inf., Post.)  [x] (45750) Provided manual therapy to mobilize proximal hip and LS spine soft tissue/joints for the purpose of modulating pain, promoting relaxation,  increasing ROM, reducing/eliminating soft tissue swelling/inflammation/restriction, improving soft tissue extensibility and allowing for proper ROM for normal function with self care, mobility, lifting and ambulation. Modalities:       Charges:  Timed Code Treatment Minutes: 45   Total Treatment Minutes: 55       [] EVAL (LOW) 26621 (typically 20 minutes face-to-face)  [] EVAL (MOD) 94766 (typically 30 minutes face-to-face)  [] EVAL (HIGH) 64481 (typically 45 minutes face-to-face)  [] RE-EVAL     [x] RK(67782) x  2   [] IONTO  [x] NMR (18792) x  1   [] VASO  [] Manual (37313) x       [] Other:  [] TA x       [] Mech Traction (88466)  [] ES(attended) (81323)      [] ES (un) (51769):     Goals:  Patient stated goal: Reduce pain;  Be able to sit and walk for long periods of time; lay in a normal

## 2017-12-29 ENCOUNTER — HOSPITAL ENCOUNTER (OUTPATIENT)
Dept: PHYSICAL THERAPY | Age: 37
Discharge: HOME | End: 2017-12-29
Admitting: ORTHOPAEDIC SURGERY

## 2017-12-29 NOTE — FLOWSHEET NOTE
[address]  Phone: [phone]   Fax:     [phone]    Physical Therapy Daily Treatment Note  Date:  2017    Patient Name:  Nabeel Escalante    :  1980  MRN: [id number]  Restrictions/Precautions:    Medical/Treatment Diagnosis Information:  Diagnosis: S/P ACL reconstruction - Z98.890, Patellofemoral arthritis - M17.10, Rupture of ACL of R knee - S98.511D  Treatment Diagnosis: Decreased strength;Decreased endurance;Decreased high-level IADLs; Insurance/Certification information:  PT Insurance Information: [facility]  Physician Information:  Referring Practitioner: Dr. Kenny Nageotte of care signed (Y/N): Y    Date of Patient follow up with Physician:     G-Code (if applicable):      Date G-Code Applied:  17  PT G-Codes  Functional Assessment Tool Used: LEFS  Score: 36% limitation  Functional Limitation: Mobility: Walking and moving around  Mobility: Walking and Moving Around Current Status (): At least 20 percent but less than 40 percent impaired, limited or restricted  Mobility: Walking and Moving Around Goal Status (): 0 percent impaired, limited or restricted     Progress Note: []  Yes  [x]  No  Next due by:        Latex Allergy:  [x]NO      []YES  Preferred Language for Healthcare:   [x]English       []other:    Visit # Insurance Allowable   11 UNL     Pain level: 0 /10     SUBJECTIVE:  Patient states that his knee is feeling better.      OBJECTIVE:    Observation:   Test measurements:     [x] \"Manipulation subgroup\"     [x]symptoms less than 16 days    []FABQ less than 19     Asked about fear of moving back, but not formally assessed.     [x]Hypomobility of lumbar spine    []IR of at least 1 hip >35 degrees      Assessed, but muscle tightness    [x]no symptoms distal to the knee    [x]no red flags and minimal yellow flags    [x]no contraindications to manipulation             ROM AROM Comment     L R     Flexion 141 138     Extension 0 0         Strength L R Comment   Quad 4+/5 4+/5     Hamstring 4+/5 4+/5                 Hip  flexion 4+/5 4+/5     Hip abd 4+/5 4+/5                             Functional Mobility/Transfers: Squatting form only allowed pt to reach about 45 deg of hip flexion. Pt exhibited decreased weight acceptance on R LE and increased knee valgus. Transfers are independent.      Posture: WFL     Gait: (include devices/WB status) WFL       RESTRICTIONS/PRECAUTIONS: None     Exercises/Interventions:   Exercise/Equipment Resistance/Repetitions Other comments   Bike 5' Stretching     Hamstring 30 sec x 3 bilaterally  Added 9/19   Child's pose  Added 9/19   Inclined Calf 10 x 10 sec Added 11/3   Hip Flexion     ITB     Groin     Quad          SLR     Supine 10 x3 #10 ^ 11/3   Abduction 10 x3 #10 ^ 11/3   Adduction 10 x3 #7.5 ^ 11/3   Prone (EOB) 10 x3 #7.5 Started 12/19   Started 8/24   Clamshells 10 x 3 #10 ^ 11/3   Bridges            CKC     Calf raises     Cid Allison sits     Step ups 10 x 3 L3 lateral ^ 11/3   1 leg stand     Squatting     CC TKE Balance     bridges     TRX squats  10 x 3Added 8/31SL anterior heel taps 10 x 3 TRX Added 9/25   Band walks   ^ 11/3   Lunges  Added 11/3   PRE     Extension  RANGE:   Flexion  RANGE:        Quantum machines     Leg press - SL  10 x 3 #85 ^12/19   Leg extension 10 x 3 #20 BL   10 x 3 #35 BL conc. SL ecc.    Added 11/3   Leg curl - SL  10 x 3 #55 ^ 11/3        Manual interventions               Therapeutic Exercise and NMR EXR  [x] (43717) Provided verbal/tactile cueing for activities related to strengthening, flexibility, endurance, ROM for improvements in LE, proximal hip, and core control with self care, mobility, lifting, ambulation.  [] (21672) Provided verbal/tactile cueing for activities related to improving balance, coordination, kinesthetic sense, posture, motor skill, Patient tolerated treatment well [] Patient limited by fatique  [] Patient limited by pain  [] Patient limited by other medical complications  [] Other:      Patient education: Patient education on PT and plan of care including diagnosis, prognosis, treatment goals and options. Patient agrees with discussed POC and treatment and is aware of rehab process.  Pt was also educated on clinic layout and use of modalities 8/24    Prognosis: [x] Good [] Fair  [] Poor    Patient Requires Follow-up: [x] Yes  [] No    PLAN: 1x/ week for 4 weeks (12/5 - 1/2)  [x] Continue per plan of care [] Alter current plan (see comments)  [] Plan of care initiated [] Hold pending MD visit [] Discharge    Electronically signed by: Ynes Quinn PT, DPT

## 2018-01-01 ENCOUNTER — HOSPITAL ENCOUNTER (OUTPATIENT)
Dept: PHYSICAL THERAPY | Age: 38
Discharge: HOME | End: 2018-01-31
Attending: ORTHOPAEDIC SURGERY | Admitting: ORTHOPAEDIC SURGERY

## 2018-01-01 ENCOUNTER — HOSPITAL ENCOUNTER (OUTPATIENT)
Dept: PHYSICAL THERAPY | Age: 38
Discharge: HOME | End: 2018-01-31
Attending: INTERNAL MEDICINE | Admitting: INTERNAL MEDICINE

## 2018-01-02 ENCOUNTER — HOSPITAL ENCOUNTER (OUTPATIENT)
Dept: PHYSICAL THERAPY | Age: 38
Discharge: HOME | End: 2018-01-02
Admitting: ORTHOPAEDIC SURGERY

## 2018-01-02 NOTE — FLOWSHEET NOTE
The [address] [place] [address] and [facility], [address] [facility], [address] [place] [address]  Phone: [phone]   Fax:     [phone]    Physical Therapy Daily Treatment Note  Date:  2018    Patient Name:  Azeem Hopper    :  1980  MRN: [id number]  Restrictions/Precautions:    Medical/Treatment Diagnosis Information:  Diagnosis: M46.1- Sacroiliitis  Treatment Diagnosis: M45.5 Low Back Pain  Insurance/Certification information:  PT Insurance Information: [facility]  Physician Information:  Referring Practitioner: Amelie Morgan MD  Plan of care signed (Y/N):     Date of Patient follow up with Physician:     G-Code (if applicable):      Date G-Code Applied: 12/15/17  [place]: 52%        Progress Note: []  Yes  []  No  Next due by: Visit #10  Or 1/15/18    Latex Allergy:  [x]NO      []YES  Preferred Language for Healthcare:   [x]English       []other:    Visit # Insurance Allowable   4  1/ UNL     Pain level: 2-3/10     SUBJECTIVE:  1/2 Patient states that he is feeling better but noticed increased pain while shoveling his driveway and lifting and carrying chairs.     OBJECTIVE:   ROM 12/15  Comments   Trunk flexion Mid Tibia bilateral    Trunk extension WNL Pain present   Trunk R sidebend WNL    Trunk L sidebend WNL    Trunk R rotation WNL    Trunk L rotation WNL    HS flexibility 20 degrees from vertical bilaterally                Strength  12/15 Left Right Comments   Hip flexion(L2) 5 5    Knee extension(L3) 5 5    Knee flexion(S1-2) 5 5    Ankle dorsiflexion(L4) 5 5    Toe extension(L5) 5 5    Ankle eversion/plantar flexion(S1) 5 5      Special tests  /15  Comments   SLR     Slump test     Pelvic symmetry  Equal standing and supine    Segmental Spinal mobility     Heel walk negative    Toe walk negative    Tandem walk negative    Forward flexion Test negative    Prone flexion R higher than L                DTRs  12/15 Left Right Comments   Patellar(L3-L4) Short Term Goals: To be achieved in: 2 weeks  1. Independent in HEP and progression per patient tolerance, in order to prevent re-injury. 2. Patient will have a decrease in pain to facilitate improvement in movement, function, and ADLs as indicated by Functional Deficits. Long Term Goals: To be achieved in: 4-6 weeks  1. Disability index score of 20% or less for the [place]adio[place] to assist with reaching prior level of function in 6 weeks. 2. Patient will demonstrate increased AROM to WNL, good LS mobility, good hip ROM to allow for proper joint functioning as indicated by patients Functional Deficits in 4 weeks. 3. Patient will demonstrate an increase in Strength to good proximal hip and core activation to allow for proper functional mobility as indicated by patients Functional Deficits in 6 weeks. 4. Patient will return to independent functional activities without increased symptoms or restriction in 6 weeks. Progression Towards Functional goals:  [x] Patient is progressing as expected towards functional goals listed. [] Progression is slowed due to complexities listed. [] Progression has been slowed due to co-morbidities. [] Plan just implemented, too soon to assess goals progression  [] Other:     ASSESSMENT:  See eval    Treatment/Activity Tolerance:  [x] Patient tolerated treatment well [] Patient limited by fatique  [] Patient limited by pain  [] Patient limited by other medical complications  [x] Other: 1/2 Good tolerance to exercises this session. Good tolerance to increase in complexity of core exercises ; required verbal and tactile cues to complete with correct form. Good tolerance to thoracic PA manipulation. Continue to monitor and progress as tolerated. Focus on core and postural retraining.      Patient education:  12/15 HEP provided and reviewed with patient     Prognosis: [x] Good [] Fair  [] Poor    Patient Requires Follow-up: [x] Yes  [] No    PLAN: See eval  [x] Continue per plan of care [] Alter current plan (see comments)  [] Plan of care initiated [] Hold pending MD visit [] Discharge    Electronically signed by: Mark Dolan PT, DPT

## 2018-01-04 ENCOUNTER — HOSPITAL ENCOUNTER (OUTPATIENT)
Dept: PHYSICAL THERAPY | Age: 38
Discharge: HOME | End: 2018-01-04
Admitting: ORTHOPAEDIC SURGERY

## 2018-01-04 NOTE — FLOWSHEET NOTE
RE-EVAL   [x] TH(93076) x  1   [] IONTO  [x] NMR (77366) x  2   [] VASO  [] Manual (35897) x       [] Other:  [x] TA x  1    [] Mech Traction (27619)  [] ES(attended) (50807)      [] ES (un) (02168):     GOALS:  12/5  Patient stated goal: able to play soccer (rec) - Not MET    Therapist goals for Patient:   Short Term Goals: To be achieved in: 2 weeks  1. Independent in HEP and progression per patient tolerance, in order to prevent re-injury. - MET  2. Patient will have a decrease in pain to facilitate improvement in movement, function, and ADLs as indicated by Functional Deficits. - MET    Long Term Goals: To be achieved in: 6-8 weeks  1. Pt will demonstrate greater than or equal to 50% improvement on LEFS to assist with reaching prior level of function. - Not MET  2. Patient will demonstrate increased right knee flex ROM to greater than or equal to 140 deg to allow for proper joint functioning as indicated by patients Functional Deficits. - Not MET  3. Patient will demonstrate an increase in right hip (flex and ABD) and knee (flex and ext) strength to 4+/5  to allow for proper functional mobility as indicated by patients Functional Deficits. -  Met  4. Patient will return to ambulating stairs without increased symptoms or restriction.  - Not MET  5. Patient will return to soccer pain free with proper form of cutting.  - Not MET    Progression Towards Functional goals:  [x] Patient is progressing as expected towards functional goals listed. [] Progression is slowed due to complexities listed. [] Progression has been slowed due to co-morbidities. [] Plan just implemented, too soon to assess goals progression  [] Other:     ASSESSMENT:   Patient tolerated treatment well this session with noticeable fatigue present at end of session. Patient reported workout feeling harder this session. Monitor response to addition of bridges next session. Discussed returning to therapy 1x every 2 weeks. 1/4    Treatment/Activity Tolerance:  [x] Patient tolerated treatment well [] Patient limited by fatique  [] Patient limited by pain  [] Patient limited by other medical complications  [] Other:      Patient education: Patient education on PT and plan of care including diagnosis, prognosis, treatment goals and options. Patient agrees with discussed POC and treatment and is aware of rehab process.  Pt was also educated on clinic layout and use of modalities 8/24    Prognosis: [x] Good [] Fair  [] Poor    Patient Requires Follow-up: [x] Yes  [] No    PLAN: 1x/ week for 4 weeks (12/5 - 1/2)  [x] Continue per plan of care [] Alter current plan (see comments)  [] Plan of care initiated [] Hold pending MD visit [] Discharge    Electronically signed by: Arslan Rangel PT, DPT

## 2018-01-09 ENCOUNTER — HOSPITAL ENCOUNTER (OUTPATIENT)
Dept: PHYSICAL THERAPY | Age: 38
Discharge: HOME | End: 2018-01-09
Admitting: ORTHOPAEDIC SURGERY

## 2018-01-09 NOTE — FLOWSHEET NOTE
[address], [address] [place] [address], [address] [place] [address]  Phone: [phone]   Fax:     [phone]    Physical Therapy Daily Treatment Note  Date:  2018    Patient Name:  Noble Caceres    :  1980  MRN: [id number]  Restrictions/Precautions:    Medical/Treatment Diagnosis Information:  Diagnosis: M46.1- Sacroiliitis  Treatment Diagnosis: M45.5 Low Back Pain  Insurance/Certification information:  PT Insurance Information: [facility]  Physician Information:  Referring Practitioner: Zarina Gaspar MD  Plan of care signed (Y/N):     Date of Patient follow up with Physician:     G-Code (if applicable):      Date G-Code Applied: 12/15/17  [place]: 52%        Progress Note: []  Yes  []  No  Next due by: Visit #10  Or 1/15/18    Latex Allergy:  [x]NO      []YES  Preferred Language for Healthcare:   [x]English       []other:    Visit # Insurance Allowable   5   UNL     Pain level: 4-5/10 1/9    SUBJECTIVE:   Patient states that he is doing okay. He states his pain is in a different spot that started yesterday. He states the pain is on the right side upper back area. He states that his low back is not bothering him at all.      OBJECTIVE:   ROM 12/15  Comments   Trunk flexion Mid Tibia bilateral    Trunk extension WNL Pain present   Trunk R sidebend WNL    Trunk L sidebend WNL    Trunk R rotation WNL    Trunk L rotation WNL    HS flexibility 20 degrees from vertical bilaterally                Strength  12/15 Left Right Comments   Hip flexion(L2) 5 5    Knee extension(L3) 5 5    Knee flexion(S1-2) 5 5    Ankle dorsiflexion(L4) 5 5    Toe extension(L5) 5 5    Ankle eversion/plantar flexion(S1) 5 5      Special tests  12/15  Comments   SLR     Slump test     Pelvic symmetry  Equal standing and supine    Segmental Spinal mobility     Heel walk negative    Toe walk negative    Tandem walk negative    Forward flexion Test negative    Prone verbal/tactile cueing for activities related to improving balance, coordination, kinesthetic sense, posture, motor skill, proprioception and motor activation to allow for proper function  with self care and ADLs  [] (42989) Provided training and instruction to the patient for proper core and proximal hip recruitment and positioning with ambulation re-education     Home Exercise Program:    [x] (80397) Reviewed/Progressed HEP activities related to strengthening, flexibility, endurance, ROM of core, proximal hip and LE for functional self-care, mobility, lifting and ambulation   [] (63852) Reviewed/Progressed HEP activities related to improving balance, coordination, kinesthetic sense, posture, motor skill, proprioception of core, proximal hip and LE for self care, mobility, lifting, and ambulation      Manual Treatments:  PROM / STM / Oscillations-Mobs:  G-I, II, III, IV (PA's, Inf., Post.)  [x] (11026) Provided manual therapy to mobilize proximal hip and LS spine soft tissue/joints for the purpose of modulating pain, promoting relaxation,  increasing ROM, reducing/eliminating soft tissue swelling/inflammation/restriction, improving soft tissue extensibility and allowing for proper ROM for normal function with self care, mobility, lifting and ambulation. Modalities:       Charges:  Timed Code Treatment Minutes: 55   Total Treatment Minutes: 60       [] EVAL (LOW) 65114 (typically 20 minutes face-to-face)  [] EVAL (MOD) 19859 (typically 30 minutes face-to-face)  [] EVAL (HIGH) 20253 (typically 45 minutes face-to-face)  [] RE-EVAL     [x] ZU(95099) x  2   [] IONTO  [x] NMR (65621) x  2   [] VASO  [] Manual (98014) x       [] Other:  [] TA x       [] Mech Traction (93425)  [] ES(attended) (17429)      [] ES (un) (07670):     Goals:  Patient stated goal: Reduce pain; Be able to sit and walk for long periods of time; lay in a normal bed    Therapist goals for Patient:   Short Term Goals: To be achieved in: 2 weeks  1. Independent in HEP and progression per patient tolerance, in order to prevent re-injury. 2. Patient will have a decrease in pain to facilitate improvement in movement, function, and ADLs as indicated by Functional Deficits. Long Term Goals: To be achieved in: 4-6 weeks  1. Disability index score of 20% or less for the [place] to assist with reaching prior level of function in 6 weeks. 2. Patient will demonstrate increased AROM to WNL, good LS mobility, good hip ROM to allow for proper joint functioning as indicated by patients Functional Deficits in 4 weeks. 3. Patient will demonstrate an increase in Strength to good proximal hip and core activation to allow for proper functional mobility as indicated by patients Functional Deficits in 6 weeks. 4. Patient will return to independent functional activities without increased symptoms or restriction in 6 weeks. Progression Towards Functional goals:  [x] Patient is progressing as expected towards functional goals listed. [] Progression is slowed due to complexities listed. [] Progression has been slowed due to co-morbidities. [] Plan just implemented, too soon to assess goals progression  [] Other:     ASSESSMENT:  See eval    Treatment/Activity Tolerance:  [x] Patient tolerated treatment well [] Patient limited by fatique  [] Patient limited by pain  [] Patient limited by other medical complications  [x] Other: 1/9 Good tolerance to exercises this session. Patient tolerated thoracic mobility and ROM exercises well. Good tolerance to DTM with use of cane this session with patient reporting feeling better at end of session. Continue to monitor and progress as tolerated. Focus on core and postural retraining.      Patient education:  12/15 HEP provided and reviewed with patient     Prognosis: [x] Good [] Fair  [] Poor    Patient Requires Follow-up: [x] Yes  [] No    PLAN: See eval  [x] Continue per plan of care [] Alter current plan (see comments)  []

## 2018-01-16 ENCOUNTER — HOSPITAL ENCOUNTER (OUTPATIENT)
Dept: PHYSICAL THERAPY | Age: 38
Discharge: HOME | End: 2018-01-16
Admitting: ORTHOPAEDIC SURGERY

## 2018-01-16 NOTE — FLOWSHEET NOTE
contraindications to manipulation             ROM AROM Comment     L R     Flexion 141 138     Extension 0 0         Strength L R Comment   Quad 4+/5 4+/5     Hamstring 4+/5 4+/5                 Hip  flexion 4+/5 4+/5     Hip abd 4+/5 4+/5                             Functional Mobility/Transfers: Squatting form only allowed pt to reach about 45 deg of hip flexion. Pt exhibited decreased weight acceptance on R LE and increased knee valgus. Transfers are independent.      Posture: WFL     Gait: (include devices/WB status) WFL       RESTRICTIONS/PRECAUTIONS: None     Exercises/Interventions:   Exercise/Equipment Resistance/Repetitions Other comments   Bike 5' Stretching     Hamstring 30 sec x 3 bilaterally  Added 9/19   Child's pose  Added 9/19   Inclined Calf 10 x 10 sec Added 11/3   Hip Flexion     ITB     Groin     Quad          SLR     Supine 10 x3 #7.5 ^ 11/3   Abduction 10 x3 #7.5 ^ 11/3   Adduction 10 x3 #7.5 ^ 11/3   Prone (EOB) 10 x3 #7.5 Started 12/19   30 secx  5 #1. 5Started 8/24   Clamshells 10 x 3 #10 ^ 11/3   Bridges  10 sec x 10 SBStart 1/4          CKC     Calf raises     Wall sits     1 leg stand 3 x 30 sec; foam Start 1/16   Ball toss SL stance 30x 2#; start 1/16   CC TKE Balance Board 3 x 30 sec each Start 1/16   bridges     TRX squats  10 x 3Added 8/31SL anterior heel taps 10 x 3  Added 9/25   Band walks  20' x 6 gray TB  ^ 1/16   Triple Threats 3 x 10 Start 1/16   Lunges  Added 11/3   PRE     Extension  RANGE:   Flexion  RANGE:        Quantum machines          Manual interventions               ALTER G: 10 min      Start 1/16    Therapeutic Exercise and NMR EXR  [x] (62144) Provided verbal/tactile cueing for activities related to strengthening, flexibility, endurance, ROM for improvements in LE, proximal hip, and core control with self care, mobility, lifting, ambulation.  [] (30276) Provided verbal/tactile cueing for activities related to improving balance, coordination, kinesthetic sense,

## 2018-01-18 ENCOUNTER — HOSPITAL ENCOUNTER (OUTPATIENT)
Dept: PHYSICAL THERAPY | Age: 38
Discharge: HOME | End: 2018-01-18
Admitting: ORTHOPAEDIC SURGERY

## 2018-01-18 NOTE — FLOWSHEET NOTE
[address], [address] [place] [address], [address] [place] [address]  Phone: [phone]   Fax:     [phone]    Physical Therapy Daily Treatment Note  Date:  2018    Patient Name:  Severa Mutter    :  1980  MRN: [id number]  Restrictions/Precautions:    Medical/Treatment Diagnosis Information:  Diagnosis: M46.1- Sacroiliitis  Treatment Diagnosis: M45.5 Low Back Pain  Insurance/Certification information:  PT Insurance Information: [facility]  Physician Information:  Referring Practitioner: Bassam Christensen MD  Plan of care signed (Y/N):     Date of Patient follow up with Physician:     G-Code (if applicable):      Date G-Code Applied: 18  [place]: 15%        Progress Note: []  Yes  []  No  Next due by: 18    Latex Allergy:  [x]NO      []YES  Preferred Language for Healthcare:   [x]English       []other:    Visit # Insurance Allowable   6   UNL     Pain level: 0/10     SUBJECTIVE:   Patient states he thinks things are going well. He states he notices generalized stiffness in the mid back region but not the same pain.      OBJECTIVE:   ROM 12/15  Comments   Trunk flexion Mid Tibia bilateral    Trunk extension WNL Pain present   Trunk R sidebend WNL    Trunk L sidebend WNL    Trunk R rotation WNL    Trunk L rotation WNL    HS flexibility 20 degrees from vertical bilaterally                Strength  /15 Left Right Comments   Hip flexion(L2) 5 5    Knee extension(L3) 5 5    Knee flexion(S1-2) 5 5    Ankle dorsiflexion(L4) 5 5    Toe extension(L5) 5 5    Ankle eversion/plantar flexion(S1) 5 5      Special tests  12/15  Comments   SLR     Slump test     Pelvic symmetry  Equal standing and supine    Segmental Spinal mobility     Heel walk negative    Toe walk negative    Tandem walk negative    Forward flexion Test negative    Prone flexion R higher than L                DTRs  12/15 Left Right Comments   Patellar(L3-L4) 2+ 2+ Achilles(S1-S2) 2+ 2+            Joint mobility: 12/15   [x]Normal    []Hypo   []Hyper    Palpation: Tenderness L PSIS   12/15    Functional Mobility/Transfers: Independent with increased pain; unable to work out due to pain  12/15     Posture: Forward head and rounded shoulders  12/15    Gait: (include devices/WB status) WNL  12/15                       [x] Patient history, allergies, meds reviewed. Medical chart reviewed. See intake form.    12/15    RESTRICTIONS/PRECAUTIONS: R ACL Repair 2016    Exercises/Interventions: BILATERAL  ROM/stretches     SKTC     DKTC 30 sec x 5 Start 12/15   Prayer stretch     Supine HS 30 sec x 5 Start 12/15   Quadruped rocking 30x Start 1/9   Piriformis (supine) 30 sec x 5 Start 12/20   Hook lying rotation 10 sec x 10 Start 12/15   Cat and camel 3 x 10 Start 12/20   Thoracic rotation (supine) 30 x each direction Start 1/9   Thoracic extension w/ towel 20x Start 1/9   Strengthening     SLR     Ball squats     Ball heel raises     Sit ups      UE Flexion with TA ISO (90/90) 30x 4#; start 1/18   TA Iso 10 sec x 10 Start 12/27   TA Iso with ADD 10 sec x 10 Start 12/27   TA Iso with ABD 10 sec x 10 Start 12/27   TA iso marching 30 x each leg Start 1/2   TA Iso heel slide 30 x each leg Start 1/2   Scapular Retraction 10 sec  x 10 Start 1/9   DTM with cane 5 min Start 1/9   Planks 30 sec x 3 Start 1/18   Paloff press 30 x each direction Silver; start 1/18       Manual Intervention             Prone PA  5 min Start 1/2   GISTM/STM              Therapeutic Exercise and NMR EXR  [x] (22986) Provided verbal/tactile cueing for activities related to strengthening, flexibility, endurance, ROM  for improvements in proximal hip and core control with self care, mobility, lifting and ambulation.  [] (81755) Provided verbal/tactile cueing for activities related to improving balance, coordination, kinesthetic sense, posture, motor skill, proprioception  to assist with core control in self care, mobility, lifting, and ambulation. Therapeutic Activities:    [] (85181 or 96379) Provided verbal/tactile cueing for activities related to improving balance, coordination, kinesthetic sense, posture, motor skill, proprioception and motor activation to allow for proper function  with self care and ADLs  [] (40304) Provided training and instruction to the patient for proper core and proximal hip recruitment and positioning with ambulation re-education     Home Exercise Program:    [x] (03355) Reviewed/Progressed HEP activities related to strengthening, flexibility, endurance, ROM of core, proximal hip and LE for functional self-care, mobility, lifting and ambulation   [] (30245) Reviewed/Progressed HEP activities related to improving balance, coordination, kinesthetic sense, posture, motor skill, proprioception of core, proximal hip and LE for self care, mobility, lifting, and ambulation      Manual Treatments:  PROM / STM / Oscillations-Mobs:  G-I, II, III, IV (PA's, Inf., Post.)  [x] (43128) Provided manual therapy to mobilize proximal hip and LS spine soft tissue/joints for the purpose of modulating pain, promoting relaxation,  increasing ROM, reducing/eliminating soft tissue swelling/inflammation/restriction, improving soft tissue extensibility and allowing for proper ROM for normal function with self care, mobility, lifting and ambulation. Modalities:       Charges:  Timed Code Treatment Minutes: 45   Total Treatment Minutes: 60       [] EVAL (LOW) 25459 (typically 20 minutes face-to-face)  [] EVAL (MOD) 30093 (typically 30 minutes face-to-face)  [] EVAL (HIGH) 90047 (typically 45 minutes face-to-face)  [] RE-EVAL     [x] XX(66113) x  1   [] IONTO  [x] NMR (36297) x  1   [] VASO  [] Manual (32619) x       [] Other:  [x] TA x  1    [] Mech Traction (42408)  [] ES(attended) (39910)      [] ES (un) (12286):     Goals:  Patient stated goal: Reduce pain;  Be able to sit and walk for long periods of time; lay in a normal bed    Therapist goals for Patient:   Short Term Goals: To be achieved in: 2 weeks  1. Independent in HEP and progression per patient tolerance, in order to prevent re-injury. 2. Patient will have a decrease in pain to facilitate improvement in movement, function, and ADLs as indicated by Functional Deficits. Long Term Goals: To be achieved in: 4-6 weeks  1. Disability index score of 20% or less for the [place] to assist with reaching prior level of function in 6 weeks. 2. Patient will demonstrate increased AROM to WNL, good LS mobility, good hip ROM to allow for proper joint functioning as indicated by patients Functional Deficits in 4 weeks. 3. Patient will demonstrate an increase in Strength to good proximal hip and core activation to allow for proper functional mobility as indicated by patients Functional Deficits in 6 weeks. 4. Patient will return to independent functional activities without increased symptoms or restriction in 6 weeks. Progression Towards Functional goals:  [x] Patient is progressing as expected towards functional goals listed. [] Progression is slowed due to complexities listed. [] Progression has been slowed due to co-morbidities. [] Plan just implemented, too soon to assess goals progression  [] Other:     ASSESSMENT:  See eval    Treatment/Activity Tolerance:  [x] Patient tolerated treatment well [] Patient limited by fatique  [] Patient limited by pain  [] Patient limited by other medical complications  [x] Other: 1/18 Good tolerance to exercises this session. Patient challenged and fatigued by plank exercise and was unable to complete last set of 30 seconds without rest break. Continue to monitor and progress as tolerated. Focus on core and postural retraining.      Patient education:  12/15 HEP provided and reviewed with patient     Prognosis: [x] Good [] Fair  [] Poor    Patient Requires Follow-up: [x] Yes  [] No    PLAN: See eval  [x] Continue per plan of

## 2018-01-24 ENCOUNTER — HOSPITAL ENCOUNTER (OUTPATIENT)
Dept: PHYSICAL THERAPY | Age: 38
Discharge: HOME | End: 2018-01-24
Admitting: ORTHOPAEDIC SURGERY

## 2018-01-24 NOTE — FLOWSHEET NOTE
ASSESSMENT:   Patient tolerated treatment well this session with noticeable fatigue present at end of session. Difficulty with single leg squat using chair. Monitor response next session. 1/24    Treatment/Activity Tolerance:  [x] Patient tolerated treatment well [] Patient limited by fatique  [] Patient limited by pain  [] Patient limited by other medical complications  [] Other:      Patient education: Patient education on PT and plan of care including diagnosis, prognosis, treatment goals and options. Patient agrees with discussed POC and treatment and is aware of rehab process.  Pt was also educated on clinic layout and use of modalities 8/24    Prognosis: [x] Good [] Fair  [] Poor    Patient Requires Follow-up: [x] Yes  [] No    PLAN: 1x/ week for 4 weeks (12/5 - 1/2)  [x] Continue per plan of care [] Alter current plan (see comments)  [] Plan of care initiated [] Hold pending MD visit [] Discharge    Electronically signed by: Freedom Johnson PT, DPT

## 2018-01-26 ENCOUNTER — HOSPITAL ENCOUNTER (OUTPATIENT)
Dept: PHYSICAL THERAPY | Age: 38
Discharge: HOME | End: 2018-01-26
Admitting: ORTHOPAEDIC SURGERY

## 2018-01-26 NOTE — DISCHARGE SUMMARY
The [address] [place] [address] Sports [facility], [address] [place] [address], [address] [place] [address]  Phone: [phone]   Fax:     [phone]    Physical Therapy Daily Treatment Note  Date:  2018    Patient Name:  Ekta Lawrence    :  1980  MRN: [id number]  Restrictions/Precautions:    Medical/Treatment Diagnosis Information:  Diagnosis: M46.1- Sacroiliitis  Treatment Diagnosis: M45.5 Low Back Pain  Insurance/Certification information:  PT Insurance Information: [facility]  Physician Information:  Referring Practitioner: Adele Jon MD  Plan of care signed (Y/N):     Date of Patient follow up with Physician:     G-Code (if applicable):      Date G-Code Applied: 18  [place]: 14%   PT G-Codes  Functional Assessment Tool Used: Shalini  Score: 14% deficit  Functional Limitation: Changing and maintaining body position  Changing and Maintaining Body Position Goal Status (): At least 1 percent but less than 20 percent impaired, limited or restricted  Changing and Maintaining Body Position Discharge Status (): At least 1 percent but less than 20 percent impaired, limited or restricted    Progress Note: []  Yes  []  No  Next due by: D/C    Latex Allergy:  [x]NO      []YES  Preferred Language for Healthcare:   [x]English       []other:    Visit # Insurance Allowable   7   UNL     Pain level: 0/10     SUBJECTIVE:   Patient states that he did some physical activity this past week and was in some discomfort but states that he rolled on a tennis ball and doesn't have any pain.     OBJECTIVE:   ROM   Comments   Trunk flexion WNL    Trunk extension WNL    Trunk R sidebend WNL    Trunk L sidebend WNL    Trunk R rotation WNL    Trunk L rotation WNL    HS flexibility 20 degrees from vertical bilaterally                Strength  12/15 Left Right Comments   Hip flexion(L2) 5 5    Knee extension(L3) 5 5    Knee flexion(S1-2) 5 5    Ankle dorsiflexion(L4) 5 5 to strengthening, flexibility, endurance, ROM  for improvements in proximal hip and core control with self care, mobility, lifting and ambulation.  [] (29389) Provided verbal/tactile cueing for activities related to improving balance, coordination, kinesthetic sense, posture, motor skill, proprioception  to assist with core control in self care, mobility, lifting, and ambulation. Therapeutic Activities:    [] (67438 or 29114) Provided verbal/tactile cueing for activities related to improving balance, coordination, kinesthetic sense, posture, motor skill, proprioception and motor activation to allow for proper function  with self care and ADLs  [] (04761) Provided training and instruction to the patient for proper core and proximal hip recruitment and positioning with ambulation re-education     Home Exercise Program:    [x] (99373) Reviewed/Progressed HEP activities related to strengthening, flexibility, endurance, ROM of core, proximal hip and LE for functional self-care, mobility, lifting and ambulation   [] (12152) Reviewed/Progressed HEP activities related to improving balance, coordination, kinesthetic sense, posture, motor skill, proprioception of core, proximal hip and LE for self care, mobility, lifting, and ambulation      Manual Treatments:  PROM / STM / Oscillations-Mobs:  G-I, II, III, IV (PA's, Inf., Post.)  [x] (13945) Provided manual therapy to mobilize proximal hip and LS spine soft tissue/joints for the purpose of modulating pain, promoting relaxation,  increasing ROM, reducing/eliminating soft tissue swelling/inflammation/restriction, improving soft tissue extensibility and allowing for proper ROM for normal function with self care, mobility, lifting and ambulation.      Modalities:       Charges:  Timed Code Treatment Minutes: 30   Total Treatment Minutes: 45       [] EVAL (LOW) 12620 (typically 20 minutes face-to-face)  [] EVAL (MOD) 90385 (typically 30 minutes face-to-face)  [] EVAL (HIGH) 01546 (typically 45 minutes face-to-face)  [] RE-EVAL     [x] QN(57575) x  1   [] IONTO  [x] NMR (68399) x  1   [] VASO  [] Manual (89886) x       [] Other:  [] TA x       [] Mech Traction (87773)  [] ES(attended) (35166)      [] ES (un) (54283):     Goals:  Patient stated goal: Reduce pain; Be able to sit and walk for long periods of time; lay in a normal bed    Therapist goals for Patient:   Short Term Goals: To be achieved in: 2 weeks  1. Independent in HEP and progression per patient tolerance, in order to prevent re-injury. MET  2. Patient will have a decrease in pain to facilitate improvement in movement, function, and ADLs as indicated by Functional Deficits. MET    Long Term Goals: To be achieved in: 4-6 weeks  1. Disability index score of 20% or less for the [place] to assist with reaching prior level of function in 6 weeks. MET  2. Patient will demonstrate increased AROM to WNL, good LS mobility, good hip ROM to allow for proper joint functioning as indicated by patients Functional Deficits in 4 weeks. MET  3. Patient will demonstrate an increase in Strength to good proximal hip and core activation to allow for proper functional mobility as indicated by patients Functional Deficits in 6 weeks. MET  4. Patient will return to independent functional activities without increased symptoms or restriction in 6 weeks. MET       Progression Towards Functional goals:  [x] Patient is progressing as expected towards functional goals listed. [] Progression is slowed due to complexities listed. [] Progression has been slowed due to co-morbidities.   [] Plan just implemented, too soon to assess goals progression  [] Other:     ASSESSMENT:  See eval    Treatment/Activity Tolerance:  [x] Patient tolerated treatment well [] Patient limited by fatique  [] Patient limited by pain  [] Patient limited by other medical complications  [x] Other: 1/26 Patient has progressed well in therapy and has met all short term and long

## 2018-01-31 ENCOUNTER — HOSPITAL ENCOUNTER (OUTPATIENT)
Dept: PHYSICAL THERAPY | Age: 38
Discharge: HOME | End: 2018-02-01
Admitting: ORTHOPAEDIC SURGERY

## 2018-01-31 NOTE — FLOWSHEET NOTE
hip >35 degrees      Assessed, but muscle tightness    [x]no symptoms distal to the knee    [x]no red flags and minimal yellow flags    [x]no contraindications to manipulation             ROM AROM Comment     L R     Flexion 141 138     Extension 0 0         Strength L R Comment   Quad 4+/5 4+/5     Hamstring 4+/5 4+/5                 Hip  flexion 4+/5 4+/5     Hip abd 4+/5 4+/5                             Functional Mobility/Transfers: Squatting form only allowed pt to reach about 45 deg of hip flexion. Pt exhibited decreased weight acceptance on R LE and increased knee valgus. Transfers are independent.      Posture: WFL     Gait: (include devices/WB status) WFL       RESTRICTIONS/PRECAUTIONS: None     Exercises/Interventions:   Exercise/Equipment Resistance/Repetitions Other comments   Bike 5' Stretching     Hamstring 30 sec x 3 bilaterally  Added 9/19   Child's pose  Added 9/19   Inclined Calf 10 x 10 sec Added 11/3   Hip Flexion     ITB     Groin     Quad          SLR     Supine 10 x3 #7.5 ^ 11/3   Abduction 10 x3 #7.5 ^ 11/3   Adduction 10 x3 #7.5 ^ 11/3   Prone (EOB) 10 x3 #7.5 Started 12/19   SLR+30 secx  5 #1. 5Started 8/24   Clamshells 10 x 3 #10 ^ 11/3   Bridges  10 sec x 10 SBStart 1/4ABC's 1x; 1.5# Start 1/24        CKC     Calf raises     Wall sits     CC TKE Balance Board 3 x 30 sec each Start 1/16   bridges     TRX squats  10 x 3Added 8/31SL anterior heel taps 10 x 3  Added 9/25   Monster walks FWD and BKWD 3 laps Start 1/24   Band walks  20' x 6 gray TB  ^ 1/16   Triple Threats 3 x 10 Start 1/16   Lunges  Added 11/3   Steamboats 30 sec each direction Start 1/24   Single leg squat 3 x 10 Start 1/24   PRE     Extension  RANGE:   Flexion  RANGE:        Quantum machines     Leg press - SL  10 x 3 #105 ^1/31   Leg extension   10 x 3 #55 SL   ^1/31   Leg curl - BL  10 x 3 #60 ^ 1/31        Manual interventions               ALTER G: 10 min      Start 1/16    Therapeutic Exercise and NMR EXR  [x] [phone]) Provided verbal/tactile cueing for activities related to strengthening, flexibility, endurance, ROM for improvements in LE, proximal hip, and core control with self care, mobility, lifting, ambulation.  [] (44903) Provided verbal/tactile cueing for activities related to improving balance, coordination, kinesthetic sense, posture, motor skill, proprioception  to assist with LE, proximal hip, and core control in self care, mobility, lifting, ambulation and eccentric single leg control.      NMR and Therapeutic Activities:    [x] (72612 or 79708) Provided verbal/tactile cueing for activities related to improving balance, coordination, kinesthetic sense, posture, motor skill, proprioception and motor activation to allow for proper function of core, proximal hip and LE with self care and ADLs  [] (61236) Gait Re-education- Provided training and instruction to the patient for proper LE, core and proximal hip recruitment and positioning and eccentric body weight control with ambulation re-education including up and down stairs     Home Exercise Program:    [x] (17850) Reviewed/Progressed HEP activities related to strengthening, flexibility, endurance, ROM of core, proximal hip and LE for functional self-care, mobility, lifting and ambulation/stair navigation   [] (11051)Reviewed/Progressed HEP activities related to improving balance, coordination, kinesthetic sense, posture, motor skill, proprioception of core, proximal hip and LE for self care, mobility, lifting, and ambulation/stair navigation      Manual Treatments:  PROM / STM / Oscillations-Mobs:  G-I, II, III, IV (PA's, Inf., Post.)  [x] (17915) Provided manual therapy to mobilize LE, proximal hip and/or LS spine soft tissue/joints for the purpose of modulating pain, promoting relaxation,  increasing ROM, reducing/eliminating soft tissue swelling/inflammation/restriction, improving soft tissue extensibility and allowing for proper ROM for normal function with self implemented, too soon to assess goals progression  [] Other:     ASSESSMENT:   Patient tolerated treatment well this session with noticeable fatigue present at end of session. Plan to biodex next session. 1/31    Treatment/Activity Tolerance:  [x] Patient tolerated treatment well [] Patient limited by fatique  [] Patient limited by pain  [] Patient limited by other medical complications  [] Other:      Patient education: Patient education on PT and plan of care including diagnosis, prognosis, treatment goals and options. Patient agrees with discussed POC and treatment and is aware of rehab process.  Pt was also educated on clinic layout and use of modalities 8/24    Prognosis: [x] Good [] Fair  [] Poor    Patient Requires Follow-up: [x] Yes  [] No    PLAN: 1x/ week for 4 weeks (12/5 - 1/2)  [x] Continue per plan of care [] Alter current plan (see comments)  [] Plan of care initiated [] Hold pending MD visit [] Discharge    Electronically signed by: Jose Vallecillo PT, DPT

## 2018-02-01 ENCOUNTER — HOSPITAL ENCOUNTER (OUTPATIENT)
Dept: PHYSICAL THERAPY | Age: 38
Discharge: HOME | End: 2018-02-28
Attending: ORTHOPAEDIC SURGERY | Admitting: ORTHOPAEDIC SURGERY

## 2018-02-02 ENCOUNTER — HOSPITAL ENCOUNTER (OUTPATIENT)
Dept: PHYSICAL THERAPY | Age: 38
Discharge: HOME | End: 2018-02-03
Admitting: ORTHOPAEDIC SURGERY

## 2018-02-02 NOTE — FLOWSHEET NOTE
- Progress to Stage 2 of return to play progression to include walk/jog program up to 30 of continuous jogging and begin plyometric training program.  - Emphasis on hamstring and quadriceps strengthening    EXERCISES: BILATERAL  Hamstring stretch 30 sec x 5     Quadriceps stretch 30 sec x 5    Incline stretch 30 sec x 5    Bike 10 min                              TIMED CODE TREATMENT MINUTES:   TE15 PERFORMANCE 30      TOTAL TREATMENT TIME:  61       Thank you for your time. Please feel free to call with any further questions.     Sincerely,  Day Castrejon DPT  2/5/2018

## 2018-02-14 ENCOUNTER — OFFICE VISIT (OUTPATIENT)
Dept: URGENT CARE | Age: 38
End: 2018-02-14

## 2018-02-14 VITALS
RESPIRATION RATE: 16 BRPM | WEIGHT: 145 LBS | BODY MASS INDEX: 21.98 KG/M2 | SYSTOLIC BLOOD PRESSURE: 110 MMHG | DIASTOLIC BLOOD PRESSURE: 80 MMHG | HEART RATE: 72 BPM | TEMPERATURE: 98.1 F | HEIGHT: 68 IN

## 2018-02-14 DIAGNOSIS — S39.012A STRAIN OF LUMBAR REGION, INITIAL ENCOUNTER: Primary | ICD-10-CM

## 2018-02-14 PROCEDURE — G8420 CALC BMI NORM PARAMETERS: HCPCS | Performed by: PHYSICIAN ASSISTANT

## 2018-02-14 PROCEDURE — G8427 DOCREV CUR MEDS BY ELIG CLIN: HCPCS | Performed by: PHYSICIAN ASSISTANT

## 2018-02-14 PROCEDURE — 99202 OFFICE O/P NEW SF 15 MIN: CPT | Performed by: PHYSICIAN ASSISTANT

## 2018-02-14 PROCEDURE — 1036F TOBACCO NON-USER: CPT | Performed by: PHYSICIAN ASSISTANT

## 2018-02-14 PROCEDURE — G8484 FLU IMMUNIZE NO ADMIN: HCPCS | Performed by: PHYSICIAN ASSISTANT

## 2018-02-14 RX ORDER — TIZANIDINE 4 MG/1
4 TABLET ORAL
COMMUNITY
Start: 2018-02-13 | End: 2018-02-23

## 2018-02-14 RX ORDER — METHYLPREDNISOLONE 4 MG/1
TABLET ORAL
Qty: 1 KIT | Refills: 0 | Status: SHIPPED | OUTPATIENT
Start: 2018-02-14 | End: 2018-05-18 | Stop reason: ALTCHOICE

## 2018-02-14 RX ORDER — TRAMADOL HYDROCHLORIDE 50 MG/1
50 TABLET ORAL EVERY 6 HOURS PRN
Qty: 10 TABLET | Refills: 0 | Status: SHIPPED | OUTPATIENT
Start: 2018-02-14 | End: 2018-03-01

## 2018-02-14 NOTE — PATIENT INSTRUCTIONS
Patient Education        Back Strain: Care Instructions  Your Care Instructions    Back strain happens when you overstretch, or pull, a muscle in your back. You may hurt your back in an accident or when you exercise or lift something. Most back pain will get better with rest and time. You can take care of yourself at home to help your back heal.  Follow-up care is a key part of your treatment and safety. Be sure to make and go to all appointments, and call your doctor if you are having problems. It's also a good idea to know your test results and keep a list of the medicines you take. How can you care for yourself at home? · Try to stay as active as you can, but stop or reduce any activity that causes pain. · Put ice or a cold pack on the sore muscle for 10 to 20 minutes at a time to stop swelling. Try this every 1 to 2 hours for 3 days (when you are awake) or until the swelling goes down. Put a thin cloth between the ice pack and your skin. · After 2 or 3 days, apply a heating pad on low or a warm cloth to your back. Some doctors suggest that you go back and forth between hot and cold treatments. · Take pain medicines exactly as directed. ¨ If the doctor gave you a prescription medicine for pain, take it as prescribed. ¨ If you are not taking a prescription pain medicine, ask your doctor if you can take an over-the-counter medicine. · Try sleeping on your side with a pillow between your legs. Or put a pillow under your knees when you lie on your back. These measures can ease pain in your lower back. · Return to your usual level of activity slowly. When should you call for help? Call 911 anytime you think you may need emergency care. For example, call if:  ? · You are unable to move a leg at all. ?Call your doctor now or seek immediate medical care if:  ? · You have new or worse symptoms in your legs, belly, or buttocks. Symptoms may include:  ¨ Numbness or tingling. ¨ Weakness. ¨ Pain.    ? · You lose bladder or bowel control. ? Watch closely for changes in your health, and be sure to contact your doctor if you are not getting better as expected. Where can you learn more? Go to [email or web address] and sign in to your UserApp account. Enter Y436 in the Icera box to learn more about \"Back Strain: Care Instructions. \"     If you do not have an account, please click on the \"Sign Up Now\" link. Current as of: March 21, 2017  Content Version: 11.5  © 0002-4389 [employer]. Care instructions adapted under license by [facility] ([facility]). If you have questions about a medical condition or this instruction, always ask your healthcare professional. [address] any warranty or liability for your use of this information. Patient Education        Learning About How to Have a Healthy Back  What causes back pain? Back pain is often caused by overuse, strain, or injury. For example, people often hurt their backs playing sports or working in the yard, being jolted in a car accident, or lifting something too heavy. Aging plays a part too. Your bones and muscles tend to lose strength as you age, which makes injury more likely. The spongy discs between the bones of the spine (vertebrae) may suffer from wear and tear and no longer provide enough cushion between the bones. A disc that bulges or breaks open (herniated disc) can press on nerves, causing back pain. In some people, back pain is the result of arthritis, broken vertebrae caused by bone loss (osteoporosis), illness, or a spine problem. Although most people have back pain at one time or another, there are steps you can take to make it less likely. How can you have a healthy back? Reduce stress on your back through good posture  Slumping or slouching alone may not cause low back pain. But after the back has been strained or injured, bad posture can make pain worse.   · Sleep in a position that maintains your back's normal curves and on a mattress that feels comfortable. Sleep on your side with a pillow between your knees, or sleep on your back with a pillow under your knees. These positions can reduce strain on your back. · Stand and sit up straight. \"Good posture\" generally means your ears, shoulders, and hips are in a straight line. · If you must stand for a long time, put one foot on a stool, ledge, or box. Switch feet every now and then. · Sit in a chair that is low enough to let you place both feet flat on the floor with both knees nearly level with your hips. If your chair or desk is too high, use a footrest to raise your knees. Place a small pillow, a rolled-up towel, or a lumbar roll in the curve of your back if you need extra support. · Try a kneeling chair, which helps tilt your hips forward. This takes pressure off your lower back. · Try sitting on an exercise ball. It can rock from side to side, which helps keep your back loose. · When driving, keep your knees nearly level with your hips. Sit straight, and drive with both hands on the steering wheel. Your arms should be in a slightly bent position. Reduce stress on your back through careful lifting  · Squat down, bending at the hips and knees only. If you need to, put one knee to the floor and extend your other knee in front of you, bent at a right angle (half kneeling). · Press your chest straight forward. This helps keep your upper back straight while keeping a slight arch in your low back. · Hold the load as close to your body as possible, at the level of your belly button (navel). · Use your feet to change direction, taking small steps. · Lead with your hips as you change direction. Keep your shoulders in line with your hips as you move. · Set down your load carefully, squatting with your knees and hips only. Exercise and stretch your back  · Do some exercise on most days of the week, if your doctor says it is okay.  You [email or web address] and sign in to your Kochzauber account. Enter L315 in the [facility] box to learn more about \"Learning About How to Have a Healthy Back. \"     If you do not have an account, please click on the \"Sign Up Now\" link. Current as of: March 21, 2017  Content Version: 11.5  © 6938-1260 Healthwise, [employer]. Care instructions adapted under license by [facility] ([facility]). If you have questions about a medical condition or this instruction, always ask your healthcare professional. [address] any warranty or liability for your use of this information.

## 2018-02-15 ENCOUNTER — OFFICE VISIT (OUTPATIENT)
Dept: ORTHOPEDIC SURGERY | Age: 38
End: 2018-02-15

## 2018-02-15 VITALS — WEIGHT: 147 LBS | HEIGHT: 68 IN | BODY MASS INDEX: 22.28 KG/M2

## 2018-02-15 DIAGNOSIS — M43.16 SPONDYLOLISTHESIS OF LUMBAR REGION: Primary | ICD-10-CM

## 2018-02-15 DIAGNOSIS — M54.50 PAIN OF LUMBAR SPINE: ICD-10-CM

## 2018-02-15 DIAGNOSIS — S39.012A STRAIN OF LUMBAR REGION, INITIAL ENCOUNTER: ICD-10-CM

## 2018-02-15 PROCEDURE — G8420 CALC BMI NORM PARAMETERS: HCPCS | Performed by: PHYSICAL MEDICINE & REHABILITATION

## 2018-02-15 PROCEDURE — 99203 OFFICE O/P NEW LOW 30 MIN: CPT | Performed by: PHYSICAL MEDICINE & REHABILITATION

## 2018-02-15 PROCEDURE — G8427 DOCREV CUR MEDS BY ELIG CLIN: HCPCS | Performed by: PHYSICAL MEDICINE & REHABILITATION

## 2018-02-15 PROCEDURE — 1036F TOBACCO NON-USER: CPT | Performed by: PHYSICAL MEDICINE & REHABILITATION

## 2018-02-15 PROCEDURE — G8484 FLU IMMUNIZE NO ADMIN: HCPCS | Performed by: PHYSICAL MEDICINE & REHABILITATION

## 2018-02-15 ASSESSMENT — ENCOUNTER SYMPTOMS
ABDOMINAL PAIN: 0
NAUSEA: 0
VOMITING: 0
BACK PAIN: 1

## 2018-02-15 NOTE — LETTER
[facility]  [address]  [address] [facility]  Phone: [phone]  Fax: [phone]    Nancy Antoine MD        February 15, 2018     Patient: Severa Mutter   YOB: 1980   Date of Visit: 2/15/2018       To Whom It May Concern: It is my medical opinion that Severa Mutter should be permitted to have a standing desk while at work due to low back issues. If you have any questions or concerns, please don't hesitate to call. Sincerely,         Rosalind Adam.  Jacobo Boxer, MD.

## 2018-02-21 ENCOUNTER — HOSPITAL ENCOUNTER (OUTPATIENT)
Dept: PHYSICAL THERAPY | Age: 38
Discharge: HOME | End: 2018-02-22
Admitting: ORTHOPAEDIC SURGERY

## 2018-02-28 ENCOUNTER — HOSPITAL ENCOUNTER (OUTPATIENT)
Dept: PHYSICAL THERAPY | Age: 38
Discharge: HOME | End: 2018-03-01
Admitting: ORTHOPAEDIC SURGERY

## 2018-03-01 ENCOUNTER — HOSPITAL ENCOUNTER (OUTPATIENT)
Dept: PHYSICAL THERAPY | Age: 38
Discharge: HOME | End: 2018-03-31
Attending: ORTHOPAEDIC SURGERY | Admitting: ORTHOPAEDIC SURGERY

## 2018-03-07 ENCOUNTER — HOSPITAL ENCOUNTER (OUTPATIENT)
Dept: PHYSICAL THERAPY | Age: 38
Discharge: HOME | End: 2018-03-08
Admitting: ORTHOPAEDIC SURGERY

## 2018-03-07 NOTE — FLOWSHEET NOTE
[facility]        Lower Extremity Daily Performance Training Note  Date: 2018  Patient Name:  Todd House    :  1980  MRN: [id number]  Restrictions/Precautions:   Medical/Treatment Diagnosis Information:   ·   Diagnosis: S/P ACL reconstruction - Z98.890, Patellofemoral arthritis - M17.10, Rupture of ACL of R knee - S98.511D  · Treatment Diagnosis: Decreased strength;Decreased endurance;Decreased high-level IADLs; Insurance/Certification information:   [facility]    Physician Information:    Referring Practitioner: Dr. Bethany Singh      Pain level: 0/10     Visit Number: 2 (, )    Subjective: Pt reports no R knee pain at today's visit. Reports back pain is better, feels exercises he was given have been helping. Objective:  Observation:       Exercises:  Exercise/Equipment Resistance/Repetitions Other comments   Bike  7'    HS Stretch  5x30\"    Quad Stretch  5x30\"    Incline Calf Stretch  5x30\"          Quantum Machines:     Leg Press  3x10 100# SL    Leg Ext 3x10 40# SL    Leg Curls  3x10 55# DL         Band Walks Monster 3 laps gray  S/S 3 laps gray    Wall Sits  3x30\"         Step ups  3x10 L4         HS curls on ball 3x10                                                                                                                                                                                                                                                         Other Therapeutic Activities: Declined Ice    Home Exercise Program: Given by PT, Given exercises for lower back on 2/15 when pt saw Dr. Alfred Manuel. Patient Education:  (): Pt stated a new episode of lower back pain since his last PT appointment, in which he saw Alfred Fernandes on 2/15 and had x-rays done which showed degenerative disc disease L5-S1.  Pt was given HEP and it was recommended if no improvement in symptoms that

## 2018-03-23 ENCOUNTER — OFFICE VISIT (OUTPATIENT)
Dept: ORTHOPEDIC SURGERY | Age: 38
End: 2018-03-23

## 2018-03-23 VITALS
DIASTOLIC BLOOD PRESSURE: 79 MMHG | SYSTOLIC BLOOD PRESSURE: 128 MMHG | HEIGHT: 68 IN | WEIGHT: 147 LBS | BODY MASS INDEX: 22.28 KG/M2

## 2018-03-23 DIAGNOSIS — M51.36 DDD (DEGENERATIVE DISC DISEASE), LUMBAR: Primary | ICD-10-CM

## 2018-03-23 DIAGNOSIS — M47.816 SPONDYLOSIS OF LUMBAR REGION WITHOUT MYELOPATHY OR RADICULOPATHY: ICD-10-CM

## 2018-03-23 DIAGNOSIS — M79.18 MYOFASCIAL PAIN: ICD-10-CM

## 2018-03-23 PROCEDURE — G8420 CALC BMI NORM PARAMETERS: HCPCS | Performed by: PHYSICAL MEDICINE & REHABILITATION

## 2018-03-23 PROCEDURE — G8484 FLU IMMUNIZE NO ADMIN: HCPCS | Performed by: PHYSICAL MEDICINE & REHABILITATION

## 2018-03-23 PROCEDURE — 1036F TOBACCO NON-USER: CPT | Performed by: PHYSICAL MEDICINE & REHABILITATION

## 2018-03-23 PROCEDURE — 99213 OFFICE O/P EST LOW 20 MIN: CPT | Performed by: PHYSICAL MEDICINE & REHABILITATION

## 2018-03-23 PROCEDURE — G8427 DOCREV CUR MEDS BY ELIG CLIN: HCPCS | Performed by: PHYSICAL MEDICINE & REHABILITATION

## 2018-03-28 ENCOUNTER — HOSPITAL ENCOUNTER (OUTPATIENT)
Dept: PHYSICAL THERAPY | Age: 38
Discharge: HOME | End: 2018-03-29
Admitting: ORTHOPAEDIC SURGERY

## 2018-03-28 NOTE — FLOWSHEET NOTE
[facility]        Lower Extremity Daily Performance Training Note  Date: Date:  3/28/2018  Patient Name:  Jeff Owens    :  1980  MRN: [id number]  Restrictions/Precautions:   Medical/Treatment Diagnosis Information:   ·   Diagnosis: S/P ACL reconstruction - Z98.890, Patellofemoral arthritis - M17.10, Rupture of ACL of R knee - S98.511D  · Treatment Diagnosis: Decreased strength;Decreased endurance;Decreased high-level IADLs; Insurance/Certification information:   [facility]    Physician Information:    Referring Practitioner: Dr. Rupal Mendez      Pain level: 0/10     Visit Number: 4 (, , 3/7, 3/28)    Subjective: Pt reports no R knee pain at today's visit. Back pain started back up, has MRI this coming Friday 3/30. Objective:  Observation:       Exercises:  Exercise/Equipment Resistance/Repetitions Other comments   Bike  7   HS Stretch  5x30\"    Quad Stretch  5x30\"    Incline Calf Stretch  5x30\"          Quantum Machines:     Leg Press  3x10 100# SL    Leg Ext 3x10 40# SL    Leg Curls  3x10 55# DL         Band Walks Monster 3 laps gray      Wall Sits  3x30\"         HS curls on ball 3x10                                                                                                                                                                                                                                                         Other Therapeutic Activities: Declined Ice    Home Exercise Program: Given by PT, Given exercises for lower back on 2/15 when pt saw Dr. Norberto Sanders. Patient Education:  (): Pt stated a new episode of lower back pain since his last PT appointment, in which he saw Norberto Fernandes on 2/15 and had x-rays done which showed degenerative disc disease L5-S1. Pt was given HEP and it was recommended if no improvement in symptoms that pt follow up in 4-6 weeks.

## 2018-04-06 ENCOUNTER — OFFICE VISIT (OUTPATIENT)
Dept: ORTHOPEDIC SURGERY | Age: 38
End: 2018-04-06

## 2018-04-06 VITALS
WEIGHT: 147 LBS | DIASTOLIC BLOOD PRESSURE: 64 MMHG | BODY MASS INDEX: 22.28 KG/M2 | SYSTOLIC BLOOD PRESSURE: 126 MMHG | HEIGHT: 68 IN

## 2018-04-06 DIAGNOSIS — M51.26 PROTRUDED LUMBAR DISC: Primary | ICD-10-CM

## 2018-04-06 PROCEDURE — 1036F TOBACCO NON-USER: CPT | Performed by: PHYSICAL MEDICINE & REHABILITATION

## 2018-04-06 PROCEDURE — G8427 DOCREV CUR MEDS BY ELIG CLIN: HCPCS | Performed by: PHYSICAL MEDICINE & REHABILITATION

## 2018-04-06 PROCEDURE — 99212 OFFICE O/P EST SF 10 MIN: CPT | Performed by: PHYSICAL MEDICINE & REHABILITATION

## 2018-04-06 PROCEDURE — G8420 CALC BMI NORM PARAMETERS: HCPCS | Performed by: PHYSICAL MEDICINE & REHABILITATION

## 2018-04-09 NOTE — PROGRESS NOTES
Results for the following test have been finalized and entered into the patients chart.
station: normal, patient ambulates without assistance  · Additional Examinations:  · RIGHT LOWER EXTREMITY: Inspection/examination of the right lower extremity does not show any tenderness, deformity or injury. Range of motion is normal and pain-free. There is no gross instability. There are no rashes, ulcerations or lesions. Strength and tone are normal. No atrophy or abnormal movements are noted. · LEFT LOWER EXTREMITY:  Inspection/examination of the left lower extremity does not show any tenderness, deformity or injury. Range of motion is normal and pain-free. There is no gross instability. There are no rashes, ulcerations or lesions. Strength and tone are normal. No atrophy or abnormal movements are noted. Diagnostic Testing:    No new diagnostics  Results for orders placed or performed during the hospital encounter of 11/10/16   POCT Glucose   Result Value Ref Range    POC Glucose 94 70 - 99 mg/dl    Performed on ACCU-CHEK      Impression:       1. DDD (degenerative disc disease), lumbar    2. Spondylosis of lumbar region without myelopathy or radiculopathy    3. Myofascial pain        Plan:  Clinical Course: Worsening back pain  1. Medications:  Continue anti-inflammatories with appropriate GI Precautions including to stop if develop dark tarry stools or GI upset and to take with food. 2. PT:  Encouraged to continue with HEP. 3. Further studies:  I will obtain a Lumbar MR without contrast to evaluate for soft tissue pathology or stenosis contributing to the back pain and paresthesias. 4. Interventional:  None at this time  5. Follow up:  4-6 weeks          Rosa Maria Isaac MD, [place], [facility]  Board Certified in [address] [place] [address]  Certified and Fellowship Trained in [facility] ([facility])             This dictation was performed with a verbal recognition program [facility]) and it was checked for errors.  It is possible that

## 2018-04-13 ENCOUNTER — HOSPITAL ENCOUNTER (OUTPATIENT)
Dept: PHYSICAL THERAPY | Age: 38
Discharge: HOME | End: 2018-04-30
Admitting: PHYSICAL MEDICINE & REHABILITATION

## 2018-04-23 ENCOUNTER — HOSPITAL ENCOUNTER (OUTPATIENT)
Dept: PHYSICAL THERAPY | Age: 38
Discharge: HOME | End: 2018-04-24
Admitting: PHYSICAL MEDICINE & REHABILITATION

## 2018-04-30 ENCOUNTER — HOSPITAL ENCOUNTER (OUTPATIENT)
Dept: PHYSICAL THERAPY | Age: 38
Discharge: HOME | End: 2018-05-01
Admitting: PHYSICAL MEDICINE & REHABILITATION

## 2018-05-01 ENCOUNTER — HOSPITAL ENCOUNTER (OUTPATIENT)
Dept: PHYSICAL THERAPY | Age: 38
Discharge: HOME | End: 2018-05-31
Attending: PHYSICAL MEDICINE & REHABILITATION | Admitting: PHYSICAL MEDICINE & REHABILITATION

## 2018-05-21 ENCOUNTER — OFFICE VISIT (OUTPATIENT)
Dept: ORTHOPEDIC SURGERY | Age: 38
End: 2018-05-21

## 2018-05-21 VITALS
SYSTOLIC BLOOD PRESSURE: 133 MMHG | BODY MASS INDEX: 22.29 KG/M2 | HEART RATE: 88 BPM | WEIGHT: 147.05 LBS | HEIGHT: 68 IN | DIASTOLIC BLOOD PRESSURE: 85 MMHG

## 2018-05-21 DIAGNOSIS — M54.59 MECHANICAL LOW BACK PAIN: Primary | ICD-10-CM

## 2018-05-21 DIAGNOSIS — G89.29 CHRONIC THORACIC BACK PAIN, UNSPECIFIED BACK PAIN LATERALITY: ICD-10-CM

## 2018-05-21 DIAGNOSIS — M54.6 CHRONIC THORACIC BACK PAIN, UNSPECIFIED BACK PAIN LATERALITY: ICD-10-CM

## 2018-05-21 DIAGNOSIS — M51.26 HNP (HERNIATED NUCLEUS PULPOSUS), LUMBAR: ICD-10-CM

## 2018-05-21 PROCEDURE — G8427 DOCREV CUR MEDS BY ELIG CLIN: HCPCS | Performed by: PHYSICAL MEDICINE & REHABILITATION

## 2018-05-21 PROCEDURE — G8420 CALC BMI NORM PARAMETERS: HCPCS | Performed by: PHYSICAL MEDICINE & REHABILITATION

## 2018-05-21 PROCEDURE — 99213 OFFICE O/P EST LOW 20 MIN: CPT | Performed by: PHYSICAL MEDICINE & REHABILITATION

## 2018-05-21 PROCEDURE — 1036F TOBACCO NON-USER: CPT | Performed by: PHYSICAL MEDICINE & REHABILITATION

## 2018-05-30 ENCOUNTER — HOSPITAL ENCOUNTER (OUTPATIENT)
Dept: PHYSICAL THERAPY | Age: 38
Discharge: HOME | End: 2018-05-31
Admitting: PHYSICAL MEDICINE & REHABILITATION

## 2018-06-01 ENCOUNTER — HOSPITAL ENCOUNTER (OUTPATIENT)
Dept: PHYSICAL THERAPY | Age: 38
Discharge: HOME | End: 2018-06-30
Attending: PHYSICAL MEDICINE & REHABILITATION | Admitting: PHYSICAL MEDICINE & REHABILITATION

## 2018-06-11 ENCOUNTER — HOSPITAL ENCOUNTER (OUTPATIENT)
Dept: PHYSICAL THERAPY | Age: 38
Discharge: HOME | End: 2018-06-12
Admitting: PHYSICAL MEDICINE & REHABILITATION

## 2018-06-21 ENCOUNTER — HOSPITAL ENCOUNTER (OUTPATIENT)
Dept: PHYSICAL THERAPY | Age: 38
Discharge: HOME | End: 2018-06-22
Admitting: PHYSICAL MEDICINE & REHABILITATION

## 2018-06-27 ENCOUNTER — HOSPITAL ENCOUNTER (OUTPATIENT)
Dept: PHYSICAL THERAPY | Age: 38
Discharge: HOME | End: 2018-06-28
Admitting: PHYSICAL MEDICINE & REHABILITATION

## 2018-07-01 ENCOUNTER — HOSPITAL ENCOUNTER (OUTPATIENT)
Dept: PHYSICAL THERAPY | Age: 38
Discharge: HOME | End: 2018-07-31
Attending: PHYSICAL MEDICINE & REHABILITATION | Admitting: PHYSICAL MEDICINE & REHABILITATION

## 2018-07-31 ENCOUNTER — HOSPITAL ENCOUNTER (OUTPATIENT)
Dept: PHYSICAL THERAPY | Age: 38
Discharge: HOME | End: 2018-08-01
Admitting: PHYSICAL MEDICINE & REHABILITATION

## 2018-07-31 ENCOUNTER — HOSPITAL ENCOUNTER (OUTPATIENT)
Dept: PHYSICAL THERAPY | Age: 38
Discharge: HOME | End: 2018-08-31
Admitting: PHYSICAL MEDICINE & REHABILITATION

## 2018-07-31 NOTE — FLOWSHEET NOTE
Modalities:    Charges:  Timed Code Treatment Minutes: 45   Total Treatment Minutes: 45     [] EVAL  [x] BS(34681) x  2   [] IONTO  [] NMR (91868) x      [] VASO  [x] Manual (82599) x  1    [] Other:  [] TA x       [] Mech Traction (54411)  [] ES(attended) (02421)      [] ES (un) (47248):     GOALS:  Patient stated goal: Wants to be able to tolerate sitting and bending without c/o increased      Therapist goals for Patient:   Short Term Goals: To be achieved in: 2 weeks  1. Independent in HEP and progression per patient tolerance, in order to prevent re-injury. 2. Patient will have a decrease in pain to facilitate improvement in movement, function, and ADLs as indicated by Functional Deficits.     Long Term Goals: To be achieved in: 6 weeks  1. Disability index score of 20% or less for the ROBI to assist with reaching prior level of function. 2. Patient will demonstrate increased AROM to WNL, good LS mobility, good hip ROM to allow for proper joint functioning as indicated by patients Functional Deficits. 3. Patient will demonstrate an increase in Strength to good proximal hip and core activation to allow for proper functional mobility as indicated by patients Functional Deficits. 4. Patient will return to tolerating walking at least 30 minutes without c/o increased low back pain or restriction. 5. Patient will be able to sit at work without c/o increased low back pain or issues          Progression Towards Functional goals:  [] Patient is progressing as expected towards functional goals listed. [] Progression is slowed due to complexities listed. [] Progression has been slowed due to co-morbidities. [x] Plan just implemented, too soon to assess goals progression  [] Other:     ASSESSMENT:  Reports significant reduction in pain after treatment session. Says that he feels about 95% better after manual therapy and strengthening.      Treatment/Activity Tolerance:  [] Patient tolerated treatment well [] Patient limited by fatique  [] Patient limited by pain  [] Patient limited by other medical complications  [] Other:     Prognosis: [] Good [] Fair  [] Poor    Patient Requires Follow-up: [x] Yes  [] No    PLAN: Cont to focus on thoracic mobility and stabilization  [x] Continue per plan of care [] Alter current plan (see comments)  [] Plan of care initiated [] Hold pending MD visit [] Discharge    Electronically signed by: Shavonne Ojeda PT

## 2018-08-01 ENCOUNTER — HOSPITAL ENCOUNTER (OUTPATIENT)
Dept: PHYSICAL THERAPY | Age: 38
Discharge: HOME | End: 2018-08-01
Attending: PHYSICAL MEDICINE & REHABILITATION | Admitting: PHYSICAL MEDICINE & REHABILITATION

## 2018-08-01 NOTE — FLOWSHEET NOTE
5'     NMR re-education        Mf Activation- re-ed       TrA Re-ed activation       Glute Max re-ed activation                       Therapeutic Exercise and NMR EXR  [] (77970) Provided verbal/tactile cueing for activities related to strengthening, flexibility, endurance, ROM  for improvements in proximal hip and core control with self care, mobility, lifting and ambulation.  [] (54402) Provided verbal/tactile cueing for activities related to improving balance, coordination, kinesthetic sense, posture, motor skill, proprioception  to assist with core control in self care, mobility, lifting, and ambulation. Therapeutic Activities:    [] (99245 or 58996) Provided verbal/tactile cueing for activities related to improving balance, coordination, kinesthetic sense, posture, motor skill, proprioception and motor activation to allow for proper function  with self care and ADLs  [] (83741) Provided training and instruction to the patient for proper core and proximal hip recruitment and positioning with ambulation re-education     Home Exercise Program:    [x] (02917) Reviewed/Progressed HEP activities related to strengthening, flexibility, endurance, ROM of core, proximal hip and LE for functional self-care, mobility, lifting and ambulation   [] (12052) Reviewed/Progressed HEP activities related to improving balance, coordination, kinesthetic sense, posture, motor skill, proprioception of core, proximal hip and LE for self care, mobility, lifting, and ambulation      Manual Treatments:  PROM / STM / Oscillations-Mobs:  G-I, II, III, IV (PA's, Inf., Post.)  [x] (68555) Provided manual therapy to mobilize proximal hip and LS spine soft tissue/joints for the purpose of modulating pain, promoting relaxation,  increasing ROM, reducing/eliminating soft tissue swelling/inflammation/restriction, improving soft tissue extensibility and allowing for proper ROM for normal function with self care, mobility, lifting and ambulation.

## 2018-08-10 ENCOUNTER — HOSPITAL ENCOUNTER (OUTPATIENT)
Dept: PHYSICAL THERAPY | Age: 38
Discharge: HOME | End: 2018-08-11
Admitting: PHYSICAL MEDICINE & REHABILITATION

## 2018-08-10 NOTE — FLOWSHEET NOTE
[address], [facility]    Physical Therapy Daily Treatment Note  Date:  8/10/2018    Patient Name:  Estelle Romero  \"Shil\"  :  1980  MRN: [id number]  Restrictions/Precautions:    Medical/Treatment Diagnosis Information:  · Diagnosis: Low back pain  · Treatment Diagnosis: Low back pain  Insurance/Certification information:  PT Insurance Information: [facility] - $1000 deductible, $0 copay, 80/20% coinsurance, 30 PT (9 previously used)  Physician Information:  Referring Practitioner: Dr. Meyer Arm of care signed (Y/N): Y    Date of Patient follow up with Physician:     G-Code (if applicable):      Date G-Code Applied:         Progress Note: []  Yes  []  No  Next due by: Visit #10      Latex Allergy:  [x]NO      []YES  Preferred Language for Healthcare:   [x]English       []other:    Visit # Insurance Allowable   8 21     Pain level:  3/10     SUBJECTIVE: Feels like the majority of the pain in the back is now more focused on the mid back and less on the low back. Says that he just gets a sharp sensation in the low back when he moves a certain way.      OBJECTIVE: See eval  Observation:   Test measurements:      RESTRICTIONS/PRECAUTIONS: None    Exercises/Interventions:     Therapeutic Ex Wt / Resistance sets/sec reps notes   Hip Ext       Bridge 2-1       Kneeling Alt Arm-Leg       Side lying LB rot       Front Plank       No money Nelson R loop 1 30    Hooklying bent knee fall out Maroon and Green R loop 1 30    TRX pull ups  1 20    PNF D2 flex diag Red 2 10    Bike       ISRA Flex / Abd  45# 1 30    HEP initiated       Ham string stretch       Hip Flex stretch       Wall push ups  1 30    LTR iso Black 20\" 3    Manual Intervention        Thoracic STM  8'     Thoracic Prone PA  5'     GISTM/STM       Lumbar Manip       SI Manip       Hip belt mobs       Hip LA distraction  5'     Lumbar neutral gapping  5'     NMR re-education        Mf Activation- Treatment Minutes: 45   Total Treatment Minutes: 45     [] EVAL  [x] RK(26143) x  2   [] IONTO  [] NMR (42100) x      [] VASO  [x] Manual (76486) x  1    [] Other:  [] TA x       [] Mech Traction (33811)  [] ES(attended) (01387)      [] ES (un) (43019):     GOALS:  Patient stated goal: Wants to be able to tolerate sitting and bending without c/o increased      Therapist goals for Patient:   Short Term Goals: To be achieved in: 2 weeks  1. Independent in HEP and progression per patient tolerance, in order to prevent re-injury. 2. Patient will have a decrease in pain to facilitate improvement in movement, function, and ADLs as indicated by Functional Deficits.     Long Term Goals: To be achieved in: 6 weeks  1. Disability index score of 20% or less for the ROBI to assist with reaching prior level of function. 2. Patient will demonstrate increased AROM to WNL, good LS mobility, good hip ROM to allow for proper joint functioning as indicated by patients Functional Deficits. 3. Patient will demonstrate an increase in Strength to good proximal hip and core activation to allow for proper functional mobility as indicated by patients Functional Deficits. 4. Patient will return to tolerating walking at least 30 minutes without c/o increased low back pain or restriction. 5. Patient will be able to sit at work without c/o increased low back pain or issues          Progression Towards Functional goals:  [] Patient is progressing as expected towards functional goals listed. [] Progression is slowed due to complexities listed. [] Progression has been slowed due to co-morbidities.   [x] Plan just implemented, too soon to assess goals progression  [] Other:     ASSESSMENT:  Significant increase in pain and stiffness since travel to [facility]    Treatment/Activity Tolerance:  [] Patient tolerated treatment well [] Patient limited by fatique  [] Patient limited by pain  [] Patient limited by other medical complications  []

## 2018-08-20 ENCOUNTER — HOSPITAL ENCOUNTER (OUTPATIENT)
Dept: PHYSICAL THERAPY | Age: 38
Discharge: HOME | End: 2018-08-21
Admitting: PHYSICAL MEDICINE & REHABILITATION

## 2018-08-20 NOTE — FLOWSHEET NOTE
NMR re-education        Mf Activation- re-ed       TrA Re-ed activation       Glute Max re-ed activation       PNF chops/ lifts Black / Blue 1 20 ea  bilat   Quadruped alt LE/UE motion  1 20 With cuing to maintain neutral spine     Therapeutic Exercise and NMR EXR  [] (73102) Provided verbal/tactile cueing for activities related to strengthening, flexibility, endurance, ROM  for improvements in proximal hip and core control with self care, mobility, lifting and ambulation.  [] (76918) Provided verbal/tactile cueing for activities related to improving balance, coordination, kinesthetic sense, posture, motor skill, proprioception  to assist with core control in self care, mobility, lifting, and ambulation.      Therapeutic Activities:    [] (77640 or 29858) Provided verbal/tactile cueing for activities related to improving balance, coordination, kinesthetic sense, posture, motor skill, proprioception and motor activation to allow for proper function  with self care and ADLs  [] (79072) Provided training and instruction to the patient for proper core and proximal hip recruitment and positioning with ambulation re-education     Home Exercise Program:    [x] (26540) Reviewed/Progressed HEP activities related to strengthening, flexibility, endurance, ROM of core, proximal hip and LE for functional self-care, mobility, lifting and ambulation   [] (46198) Reviewed/Progressed HEP activities related to improving balance, coordination, kinesthetic sense, posture, motor skill, proprioception of core, proximal hip and LE for self care, mobility, lifting, and ambulation      Manual Treatments:  PROM / STM / Oscillations-Mobs:  G-I, II, III, IV (PA's, Inf., Post.)  [x] (66300) Provided manual therapy to mobilize proximal hip and LS spine soft tissue/joints for the purpose of modulating pain, promoting relaxation,  increasing ROM, reducing/eliminating soft tissue swelling/inflammation/restriction, improving soft tissue

## 2018-09-01 ENCOUNTER — HOSPITAL ENCOUNTER (OUTPATIENT)
Dept: PHYSICAL THERAPY | Age: 38
Discharge: HOME | End: 2018-09-01
Attending: PHYSICAL MEDICINE & REHABILITATION | Admitting: PHYSICAL MEDICINE & REHABILITATION

## 2018-09-10 ENCOUNTER — HOSPITAL ENCOUNTER (OUTPATIENT)
Dept: PHYSICAL THERAPY | Age: 38
Discharge: HOME | End: 2018-09-11
Admitting: PHYSICAL MEDICINE & REHABILITATION

## 2018-09-27 ENCOUNTER — HOSPITAL ENCOUNTER (OUTPATIENT)
Dept: PHYSICAL THERAPY | Age: 38
Setting detail: THERAPIES SERIES
Discharge: HOME | End: 2018-09-27
Payer: COMMERCIAL

## 2018-09-27 PROCEDURE — 97140 MANUAL THERAPY 1/> REGIONS: CPT | Performed by: PHYSICAL THERAPIST

## 2018-09-27 PROCEDURE — 97110 THERAPEUTIC EXERCISES: CPT | Performed by: PHYSICAL THERAPIST

## 2018-09-27 NOTE — FLOWSHEET NOTE
NMR re-education        Mf Activation- re-ed       TrA Re-ed activation       Glute Max re-ed activation       PNF chops/ lifts Quadruped alt LE/UE motion   Therapeutic Exercise and NMR EXR  [] (49635) Provided verbal/tactile cueing for activities related to strengthening, flexibility, endurance, ROM  for improvements in proximal hip and core control with self care, mobility, lifting and ambulation.  [] (48847) Provided verbal/tactile cueing for activities related to improving balance, coordination, kinesthetic sense, posture, motor skill, proprioception  to assist with core control in self care, mobility, lifting, and ambulation.      Therapeutic Activities:    [] (61866 or 14559) Provided verbal/tactile cueing for activities related to improving balance, coordination, kinesthetic sense, posture, motor skill, proprioception and motor activation to allow for proper function  with self care and ADLs  [] (80055) Provided training and instruction to the patient for proper core and proximal hip recruitment and positioning with ambulation re-education     Home Exercise Program:    [x] (72025) Reviewed/Progressed HEP activities related to strengthening, flexibility, endurance, ROM of core, proximal hip and LE for functional self-care, mobility, lifting and ambulation   [] (30835) Reviewed/Progressed HEP activities related to improving balance, coordination, kinesthetic sense, posture, motor skill, proprioception of core, proximal hip and LE for self care, mobility, lifting, and ambulation      Manual Treatments:  PROM / STM / Oscillations-Mobs:  G-I, II, III, IV (PA's, Inf., Post.)  [x] (49940) Provided manual therapy to mobilize proximal hip and LS spine soft tissue/joints for the purpose of modulating pain, promoting relaxation,  increasing ROM, reducing/eliminating soft tissue swelling/inflammation/restriction, improving soft tissue extensibility and allowing for proper ROM for normal function with self care, mobility, lifting and ambulation. Modalities:    Charges:  Timed Code Treatment Minutes: 45   Total Treatment Minutes: 45     [] EVAL  [x] SJ(56479) x  1   [] IONTO  [] NMR (67024) x      [] VASO  [x] Manual (82576) x  2    [] Other:  [] TA x       [] Mech Traction (34312)  [] ES(attended) (09871)      [] ES (un) (01565):     GOALS:  Patient stated goal: Wants to be able to tolerate sitting and bending without c/o increased      Therapist goals for Patient:   Short Term Goals: To be achieved in: 2 weeks  1. Independent in HEP and progression per patient tolerance, in order to prevent re-injury. 2. Patient will have a decrease in pain to facilitate improvement in movement, function, and ADLs as indicated by Functional Deficits.     Long Term Goals: To be achieved in: 6 weeks  1. Disability index score of 20% or less for the ROBI to assist with reaching prior level of function. 2. Patient will demonstrate increased AROM to WNL, good LS mobility, good hip ROM to allow for proper joint functioning as indicated by patients Functional Deficits. 3. Patient will demonstrate an increase in Strength to good proximal hip and core activation to allow for proper functional mobility as indicated by patients Functional Deficits. 4. Patient will return to tolerating walking at least 30 minutes without c/o increased low back pain or restriction. 5. Patient will be able to sit at work without c/o increased low back pain or issues          Progression Towards Functional goals:  [] Patient is progressing as expected towards functional goals listed. [] Progression is slowed due to complexities listed. [] Progression has been slowed due to co-morbidities. [x] Plan just implemented, too soon to assess goals progression  [] Other:     ASSESSMENT:  Improved tolerance to quadruped exercises today. Significant fear of movement prior to performing exercises. Much greater confidence after exercises.      Treatment/Activity

## 2018-10-03 ENCOUNTER — OFFICE VISIT (OUTPATIENT)
Dept: ORTHOPEDIC SURGERY | Age: 38
End: 2018-10-03
Payer: COMMERCIAL

## 2018-10-03 VITALS
SYSTOLIC BLOOD PRESSURE: 133 MMHG | BODY MASS INDEX: 22.28 KG/M2 | DIASTOLIC BLOOD PRESSURE: 85 MMHG | HEIGHT: 68 IN | WEIGHT: 147 LBS

## 2018-10-03 DIAGNOSIS — S16.1XXA STRAIN OF NECK MUSCLE, INITIAL ENCOUNTER: ICD-10-CM

## 2018-10-03 DIAGNOSIS — M50.30 DDD (DEGENERATIVE DISC DISEASE), CERVICAL: Primary | ICD-10-CM

## 2018-10-03 DIAGNOSIS — M54.2 NECK PAIN: ICD-10-CM

## 2018-10-03 PROCEDURE — G8420 CALC BMI NORM PARAMETERS: HCPCS | Performed by: PHYSICAL MEDICINE & REHABILITATION

## 2018-10-03 PROCEDURE — G8484 FLU IMMUNIZE NO ADMIN: HCPCS | Performed by: PHYSICAL MEDICINE & REHABILITATION

## 2018-10-03 PROCEDURE — 1036F TOBACCO NON-USER: CPT | Performed by: PHYSICAL MEDICINE & REHABILITATION

## 2018-10-03 PROCEDURE — G8427 DOCREV CUR MEDS BY ELIG CLIN: HCPCS | Performed by: PHYSICAL MEDICINE & REHABILITATION

## 2018-10-03 PROCEDURE — 99214 OFFICE O/P EST MOD 30 MIN: CPT | Performed by: PHYSICAL MEDICINE & REHABILITATION

## 2018-10-03 RX ORDER — TIZANIDINE 4 MG/1
4 TABLET ORAL 3 TIMES DAILY PRN
COMMUNITY
End: 2019-03-12 | Stop reason: ALTCHOICE

## 2018-10-08 ENCOUNTER — HOSPITAL ENCOUNTER (OUTPATIENT)
Dept: PHYSICAL THERAPY | Age: 38
Setting detail: THERAPIES SERIES
Discharge: HOME | End: 2018-10-08
Payer: COMMERCIAL

## 2018-10-08 PROCEDURE — G8982 BODY POS GOAL STATUS: HCPCS | Performed by: PHYSICAL THERAPIST

## 2018-10-08 PROCEDURE — 97012 MECHANICAL TRACTION THERAPY: CPT | Performed by: PHYSICAL THERAPIST

## 2018-10-08 PROCEDURE — G8981 BODY POS CURRENT STATUS: HCPCS | Performed by: PHYSICAL THERAPIST

## 2018-10-08 PROCEDURE — 97161 PT EVAL LOW COMPLEX 20 MIN: CPT | Performed by: PHYSICAL THERAPIST

## 2018-10-08 PROCEDURE — 97140 MANUAL THERAPY 1/> REGIONS: CPT | Performed by: PHYSICAL THERAPIST

## 2018-10-08 NOTE — PLAN OF CARE
tolerating driving without increased symptoms or restriction.         Electronically signed by:  Dajuan Davies PT

## 2018-10-11 ENCOUNTER — APPOINTMENT (OUTPATIENT)
Dept: PHYSICAL THERAPY | Age: 38
End: 2018-10-11
Payer: COMMERCIAL

## 2018-10-16 ENCOUNTER — HOSPITAL ENCOUNTER (OUTPATIENT)
Dept: PHYSICAL THERAPY | Age: 38
Setting detail: THERAPIES SERIES
Discharge: HOME | End: 2018-10-16
Payer: COMMERCIAL

## 2018-10-16 PROCEDURE — 97140 MANUAL THERAPY 1/> REGIONS: CPT | Performed by: PHYSICAL THERAPIST

## 2018-10-16 PROCEDURE — 97012 MECHANICAL TRACTION THERAPY: CPT | Performed by: PHYSICAL THERAPIST

## 2018-10-16 PROCEDURE — 97110 THERAPEUTIC EXERCISES: CPT | Performed by: PHYSICAL THERAPIST

## 2018-10-16 NOTE — FLOWSHEET NOTE
lifting, house/yardwork, driving/computer work    Modalities:  Traction 20-8# x15'    Charges:  Timed Code Treatment Minutes: 45   Total Treatment Minutes: 60     [] EVAL (LOW) 08907 (typically 20 minutes face-to-face)  [] EVAL (MOD) 13718 (typically 30 minutes face-to-face)  [] EVAL (HIGH) 69422 (typically 45 minutes face-to-face)  [] RE-EVAL     [x] AH(71976) x  1   [] IONTO  [] NMR (42127) x      [] VASO  [x] Manual (19831) x  2    [] Other:  [] TA x       [x] Mech Traction (29677)  [] ES(attended) (56857)      [] ES (un) (94019):     GOALS:  Patient stated goal: Wants to decrease the neck stiffness     Therapist goals for Patient:   Short Term Goals: To be achieved in: 2 weeks  1. Independent in HEP and progression per patient tolerance, in order to prevent re-injury. 2. Patient will have a decrease in pain to facilitate improvement in movement, function, and ADLs as indicated by Functional Deficits.     Long Term Goals: To be achieved in: 6 weeks  1. Disability index score of 20% or less for the NDI to assist with reaching prior level of function. 2. Patient will demonstrate increased AROM to [facility] of cervical/thoracic spine to allow for proper joint functioning as indicated by patients Functional Deficits. 3. Patient will demonstrate an increase in postural awareness and control and activation of  Deep cervical stabilizers to allow for proper functional mobility as indicated by patients Functional Deficits. 4. Patient will return to tolerating driving without increased symptoms or restriction. Progression Towards Functional goals:  [] Patient is progressing as expected towards functional goals listed. [] Progression is slowed due to complexities listed. [] Progression has been slowed due to co-morbidities.   [x] Plan just implemented, too soon to assess goals progression  [] Other:     ASSESSMENT:  See eval    Treatment/Activity Tolerance:  [x] Patient tolerated treatment well [] Patient limited

## 2018-10-23 ENCOUNTER — HOSPITAL ENCOUNTER (OUTPATIENT)
Dept: PHYSICAL THERAPY | Age: 38
Setting detail: THERAPIES SERIES
Discharge: HOME | End: 2018-10-23
Payer: COMMERCIAL

## 2018-10-23 PROCEDURE — 97140 MANUAL THERAPY 1/> REGIONS: CPT | Performed by: PHYSICAL THERAPIST

## 2018-10-23 PROCEDURE — 97110 THERAPEUTIC EXERCISES: CPT | Performed by: PHYSICAL THERAPIST

## 2018-10-23 NOTE — FLOWSHEET NOTE
house/yardwork, driving/computer work    Modalities:  '    Charges:  Timed Code Treatment Minutes: 45   Total Treatment Minutes: 45     [] EVAL (LOW) 50189 (typically 20 minutes face-to-face)  [] EVAL (MOD) 37704 (typically 30 minutes face-to-face)  [] EVAL (HIGH) 92102 (typically 45 minutes face-to-face)  [] RE-EVAL     [x] AC(77872) x  1   [] IONTO  [] NMR (59106) x      [] VASO  [x] Manual (23124) x  2    [] Other:  [] TA x       [] Mech Traction (22858)  [] ES(attended) (18772)      [] ES (un) (10975):     GOALS:  Patient stated goal: Wants to decrease the neck stiffness     Therapist goals for Patient:   Short Term Goals: To be achieved in: 2 weeks  1. Independent in HEP and progression per patient tolerance, in order to prevent re-injury. 2. Patient will have a decrease in pain to facilitate improvement in movement, function, and ADLs as indicated by Functional Deficits.     Long Term Goals: To be achieved in: 6 weeks  1. Disability index score of 20% or less for the NDI to assist with reaching prior level of function. 2. Patient will demonstrate increased AROM to [facility] of cervical/thoracic spine to allow for proper joint functioning as indicated by patients Functional Deficits. 3. Patient will demonstrate an increase in postural awareness and control and activation of  Deep cervical stabilizers to allow for proper functional mobility as indicated by patients Functional Deficits. 4. Patient will return to tolerating driving without increased symptoms or restriction. Progression Towards Functional goals:  [] Patient is progressing as expected towards functional goals listed. [] Progression is slowed due to complexities listed. [] Progression has been slowed due to co-morbidities.   [x] Plan just implemented, too soon to assess goals progression  [] Other:     ASSESSMENT:  See eval    Treatment/Activity Tolerance:  [x] Patient tolerated treatment well [] Patient limited by syed  [] Patient

## 2018-10-30 ENCOUNTER — HOSPITAL ENCOUNTER (OUTPATIENT)
Dept: PHYSICAL THERAPY | Age: 38
Setting detail: THERAPIES SERIES
Discharge: HOME | End: 2018-10-30
Payer: COMMERCIAL

## 2018-10-30 PROCEDURE — 97140 MANUAL THERAPY 1/> REGIONS: CPT | Performed by: PHYSICAL THERAPIST

## 2018-10-30 PROCEDURE — 97110 THERAPEUTIC EXERCISES: CPT | Performed by: PHYSICAL THERAPIST

## 2018-11-29 ENCOUNTER — HOSPITAL ENCOUNTER (OUTPATIENT)
Dept: PHYSICAL THERAPY | Age: 38
Setting detail: THERAPIES SERIES
Discharge: HOME | End: 2018-11-29
Payer: COMMERCIAL

## 2018-11-29 PROCEDURE — 97110 THERAPEUTIC EXERCISES: CPT | Performed by: PHYSICAL THERAPIST

## 2018-11-29 PROCEDURE — 97140 MANUAL THERAPY 1/> REGIONS: CPT | Performed by: PHYSICAL THERAPIST

## 2018-11-29 NOTE — FLOWSHEET NOTE
[facility]      Physical Therapy Daily Treatment Note  Date:  2018    Patient Name:  Dee Kirby    :  1980  MRN: [id number]  Restrictions/Precautions:    Medical/Treatment Diagnosis Information:  Diagnosis: Cervical pain  Treatment Diagnosis: Cervical pain  Insurance/Certification information:  PT Insurance Information: [facility] - $1000 deductible, $0 copay, 80/20% coinsurance, 30 PT ( 21 previously used)  Physician Information:  Referring Practitioner: Dr. Vadim Whaley of care signed (Y/N): Y    Date of Patient follow up with Physician:     G-Code (if applicable):      Date G-Code Applied:         Progress Note: [x]  Yes  []  No  Next due by: Visit #10      Latex Allergy:  [x]NO      []YES  Preferred Language for Healthcare:   [x]English       []other:    Visit # Insurance Allowable   5 9     Pain level:  1/10     SUBJECTIVE:  Says that he still feels like he is doing well overall. Says that he tolerated his trip earlier this month with no real troubles. Says that he just mainly feels stiff and a little unsteady and guarded when he bends forward or lifts antyhing.       OBJECTIVE: See eval  Observation:   Test measurements:      RESTRICTIONS/PRECAUTIONS: None    Exercises/Interventions:   Therapeutic Ex Sets/sec Reps Notes   UBE      T- band Row/pinch      T- band lower pinch      T- band ER activation      UT stretch      Levator stretch      Isometric at wall      Quadruped w cerv retract      SA punches 1 30 2#   Supine ABC 1 2 2#   Prone Ext / [place] 1 30 2#   Supine chin tucks 4 20\"    Supine chin tuck with forehead lift 1 20                Manual Intervention      Cerv mobs/manip  18'    Thoracic mobs/manip  3'    CT manip  3'    Rib mobilizations      STM      DN            NMR re-education      T-spine Ext- foam roll      Chin tucks       No money      Wall Postural re-ed                      Therapeutic Exercise and NMR function with self care, reaching, carrying, lifting, house/yardwork, driving/computer work    Modalities:  '    Charges:  Timed Code Treatment Minutes: 45   Total Treatment Minutes: 45     [] EVAL (LOW) 95393 (typically 20 minutes face-to-face)  [] EVAL (MOD) 45830 (typically 30 minutes face-to-face)  [] EVAL (HIGH) 18567 (typically 45 minutes face-to-face)  [] RE-EVAL     [x] LS(11085) x  1   [] IONTO  [] NMR (30654) x      [] VASO  [x] Manual (15161) x  2    [] Other:  [] TA x       [] Mech Traction (80048)  [] ES(attended) (12347)      [] ES (un) (58086):     GOALS:  Patient stated goal: Wants to decrease the neck stiffness     Therapist goals for Patient:   Short Term Goals: To be achieved in: 2 weeks  1. Independent in HEP and progression per patient tolerance, in order to prevent re-injury. 2. Patient will have a decrease in pain to facilitate improvement in movement, function, and ADLs as indicated by Functional Deficits.     Long Term Goals: To be achieved in: 6 weeks  1. Disability index score of 20% or less for the NDI to assist with reaching prior level of function. 2. Patient will demonstrate increased AROM to [facility] of cervical/thoracic spine to allow for proper joint functioning as indicated by patients Functional Deficits. 3. Patient will demonstrate an increase in postural awareness and control and activation of  Deep cervical stabilizers to allow for proper functional mobility as indicated by patients Functional Deficits. 4. Patient will return to tolerating driving without increased symptoms or restriction. Progression Towards Functional goals:  [x] Patient is progressing as expected towards functional goals listed. [] Progression is slowed due to complexities listed. [] Progression has been slowed due to co-morbidities.   [] Plan just implemented, too soon to assess goals progression  [] Other:     ASSESSMENT:  Patient continues to display the need for stabilization and core

## 2018-12-18 ENCOUNTER — HOSPITAL ENCOUNTER (OUTPATIENT)
Dept: PHYSICAL THERAPY | Age: 38
Setting detail: THERAPIES SERIES
Discharge: HOME | End: 2018-12-18
Payer: COMMERCIAL

## 2018-12-18 PROCEDURE — 97140 MANUAL THERAPY 1/> REGIONS: CPT | Performed by: PHYSICAL THERAPIST

## 2018-12-18 NOTE — FLOWSHEET NOTE
self care, reaching, carrying, lifting, house/yardwork, driving/computer work    Modalities:  '    Charges:  Timed Code Treatment Minutes: 45   Total Treatment Minutes: 45     [] EVAL (LOW) 52557 (typically 20 minutes face-to-face)  [] EVAL (MOD) 73930 (typically 30 minutes face-to-face)  [] EVAL (HIGH) 29218 (typically 45 minutes face-to-face)  [] RE-EVAL     [x] NB(35868) x  1   [] IONTO  [] NMR (83321) x      [] VASO  [x] Manual (60556) x  2    [] Other:  [] TA x       [] Mech Traction (14965)  [] ES(attended) (07472)      [] ES (un) (44990):     GOALS:  Patient stated goal: Wants to decrease the neck stiffness     Therapist goals for Patient:   Short Term Goals: To be achieved in: 2 weeks  1. Independent in HEP and progression per patient tolerance, in order to prevent re-injury. 2. Patient will have a decrease in pain to facilitate improvement in movement, function, and ADLs as indicated by Functional Deficits.     Long Term Goals: To be achieved in: 6 weeks  1. Disability index score of 20% or less for the NDI to assist with reaching prior level of function. 2. Patient will demonstrate increased AROM to [facility] of cervical/thoracic spine to allow for proper joint functioning as indicated by patients Functional Deficits. 3. Patient will demonstrate an increase in postural awareness and control and activation of  Deep cervical stabilizers to allow for proper functional mobility as indicated by patients Functional Deficits. 4. Patient will return to tolerating driving without increased symptoms or restriction. Progression Towards Functional goals:  [x] Patient is progressing as expected towards functional goals listed. [] Progression is slowed due to complexities listed. [] Progression has been slowed due to co-morbidities.   [] Plan just implemented, too soon to assess goals progression  [] Other:     ASSESSMENT:  Patient continues to display the need for stabilization and core

## 2019-01-08 ENCOUNTER — HOSPITAL ENCOUNTER (OUTPATIENT)
Dept: PHYSICAL THERAPY | Age: 39
Setting detail: THERAPIES SERIES
Discharge: HOME | End: 2019-01-08
Payer: COMMERCIAL

## 2019-01-08 PROCEDURE — 97140 MANUAL THERAPY 1/> REGIONS: CPT | Performed by: PHYSICAL THERAPIST

## 2019-01-29 ENCOUNTER — APPOINTMENT (OUTPATIENT)
Dept: PHYSICAL THERAPY | Age: 39
End: 2019-01-29
Payer: COMMERCIAL

## 2019-02-05 ENCOUNTER — HOSPITAL ENCOUNTER (OUTPATIENT)
Dept: PHYSICAL THERAPY | Age: 39
Setting detail: THERAPIES SERIES
Discharge: HOME | End: 2019-02-05
Payer: COMMERCIAL

## 2019-02-05 PROCEDURE — 97140 MANUAL THERAPY 1/> REGIONS: CPT | Performed by: PHYSICAL THERAPIST

## 2019-02-15 ENCOUNTER — OFFICE VISIT (OUTPATIENT)
Dept: ORTHOPEDIC SURGERY | Age: 39
End: 2019-02-15
Payer: COMMERCIAL

## 2019-02-15 VITALS
SYSTOLIC BLOOD PRESSURE: 118 MMHG | WEIGHT: 147.05 LBS | DIASTOLIC BLOOD PRESSURE: 86 MMHG | BODY MASS INDEX: 22.29 KG/M2 | HEIGHT: 68 IN | HEART RATE: 78 BPM

## 2019-02-15 DIAGNOSIS — M50.20 HNP (HERNIATED NUCLEUS PULPOSUS), CERVICAL: Primary | ICD-10-CM

## 2019-02-15 DIAGNOSIS — G44.86 CERVICOGENIC HEADACHE: ICD-10-CM

## 2019-02-15 DIAGNOSIS — R20.2 PARESTHESIA OF LEFT UPPER EXTREMITY: ICD-10-CM

## 2019-02-15 PROCEDURE — G8484 FLU IMMUNIZE NO ADMIN: HCPCS | Performed by: PHYSICAL MEDICINE & REHABILITATION

## 2019-02-15 PROCEDURE — 1036F TOBACCO NON-USER: CPT | Performed by: PHYSICAL MEDICINE & REHABILITATION

## 2019-02-15 PROCEDURE — 99213 OFFICE O/P EST LOW 20 MIN: CPT | Performed by: PHYSICAL MEDICINE & REHABILITATION

## 2019-02-15 PROCEDURE — G8420 CALC BMI NORM PARAMETERS: HCPCS | Performed by: PHYSICAL MEDICINE & REHABILITATION

## 2019-02-15 PROCEDURE — G8427 DOCREV CUR MEDS BY ELIG CLIN: HCPCS | Performed by: PHYSICAL MEDICINE & REHABILITATION

## 2019-02-15 RX ORDER — TIZANIDINE 4 MG/1
2 TABLET ORAL NIGHTLY
Qty: 15 TABLET | Refills: 0 | Status: SHIPPED | OUTPATIENT
Start: 2019-02-15 | End: 2019-03-12 | Stop reason: ALTCHOICE

## 2019-02-18 ENCOUNTER — TELEPHONE (OUTPATIENT)
Dept: ORTHOPEDIC SURGERY | Age: 39
End: 2019-02-18

## 2019-02-25 ENCOUNTER — OFFICE VISIT (OUTPATIENT)
Dept: ORTHOPEDIC SURGERY | Age: 39
End: 2019-02-25
Payer: COMMERCIAL

## 2019-02-25 VITALS
SYSTOLIC BLOOD PRESSURE: 116 MMHG | WEIGHT: 147.05 LBS | BODY MASS INDEX: 22.29 KG/M2 | HEART RATE: 80 BPM | DIASTOLIC BLOOD PRESSURE: 86 MMHG | HEIGHT: 68 IN

## 2019-02-25 DIAGNOSIS — M50.20 HERNIATED NUCLEUS PULPOSUS, CERVICAL: Primary | ICD-10-CM

## 2019-02-25 DIAGNOSIS — M43.12 SPONDYLOLISTHESIS OF CERVICAL REGION: ICD-10-CM

## 2019-02-25 PROCEDURE — G8484 FLU IMMUNIZE NO ADMIN: HCPCS | Performed by: PHYSICAL MEDICINE & REHABILITATION

## 2019-02-25 PROCEDURE — 99213 OFFICE O/P EST LOW 20 MIN: CPT | Performed by: PHYSICAL MEDICINE & REHABILITATION

## 2019-02-25 PROCEDURE — G8427 DOCREV CUR MEDS BY ELIG CLIN: HCPCS | Performed by: PHYSICAL MEDICINE & REHABILITATION

## 2019-02-25 PROCEDURE — G8420 CALC BMI NORM PARAMETERS: HCPCS | Performed by: PHYSICAL MEDICINE & REHABILITATION

## 2019-02-25 PROCEDURE — 1036F TOBACCO NON-USER: CPT | Performed by: PHYSICAL MEDICINE & REHABILITATION

## 2019-03-07 ENCOUNTER — OFFICE VISIT (OUTPATIENT)
Dept: ORTHOPEDIC SURGERY | Age: 39
End: 2019-03-07
Payer: COMMERCIAL

## 2019-03-07 DIAGNOSIS — M79.602 PAIN OF LEFT UPPER EXTREMITY: Primary | ICD-10-CM

## 2019-03-07 PROCEDURE — 95886 MUSC TEST DONE W/N TEST COMP: CPT | Performed by: PHYSICAL MEDICINE & REHABILITATION

## 2019-03-07 PROCEDURE — 95909 NRV CNDJ TST 5-6 STUDIES: CPT | Performed by: PHYSICAL MEDICINE & REHABILITATION

## 2019-03-12 ENCOUNTER — TELEPHONE (OUTPATIENT)
Dept: ORTHOPEDIC SURGERY | Age: 39
End: 2019-03-12

## 2019-03-18 ENCOUNTER — HOSPITAL ENCOUNTER (OUTPATIENT)
Age: 39
Setting detail: OUTPATIENT SURGERY
Discharge: HOME | End: 2019-03-18
Attending: PHYSICAL MEDICINE & REHABILITATION | Admitting: PHYSICAL MEDICINE & REHABILITATION
Payer: COMMERCIAL

## 2019-03-18 ENCOUNTER — APPOINTMENT (OUTPATIENT)
Dept: GENERAL RADIOLOGY | Age: 39
End: 2019-03-18
Attending: PHYSICAL MEDICINE & REHABILITATION
Payer: COMMERCIAL

## 2019-03-18 VITALS
HEIGHT: 68 IN | WEIGHT: 150 LBS | SYSTOLIC BLOOD PRESSURE: 121 MMHG | DIASTOLIC BLOOD PRESSURE: 90 MMHG | TEMPERATURE: 98.2 F | HEART RATE: 74 BPM | OXYGEN SATURATION: 100 % | BODY MASS INDEX: 22.73 KG/M2 | RESPIRATION RATE: 14 BRPM

## 2019-03-18 PROCEDURE — 3209999900 FLUORO FOR SURGICAL PROCEDURES

## 2019-03-18 PROCEDURE — 2500000003 HC RX 250 WO HCPCS: Performed by: PHYSICAL MEDICINE & REHABILITATION

## 2019-03-18 PROCEDURE — 2580000003 HC RX 258: Performed by: PHYSICAL MEDICINE & REHABILITATION

## 2019-03-18 PROCEDURE — 3610000054 HC PAIN LEVEL 3 BASE (NON-OR): Performed by: PHYSICAL MEDICINE & REHABILITATION

## 2019-03-18 PROCEDURE — 2709999900 HC NON-CHARGEABLE SUPPLY: Performed by: PHYSICAL MEDICINE & REHABILITATION

## 2019-03-18 PROCEDURE — 6360000002 HC RX W HCPCS: Performed by: PHYSICAL MEDICINE & REHABILITATION

## 2019-03-18 PROCEDURE — 99152 MOD SED SAME PHYS/QHP 5/>YRS: CPT | Performed by: PHYSICAL MEDICINE & REHABILITATION

## 2019-03-18 RX ORDER — BETAMETHASONE SODIUM PHOSPHATE AND BETAMETHASONE ACETATE 3; 3 MG/ML; MG/ML
INJECTION, SUSPENSION INTRA-ARTICULAR; INTRALESIONAL; INTRAMUSCULAR; SOFT TISSUE
Status: COMPLETED | OUTPATIENT
Start: 2019-03-18 | End: 2019-03-18

## 2019-03-18 RX ORDER — LIDOCAINE HYDROCHLORIDE 10 MG/ML
INJECTION, SOLUTION INFILTRATION; PERINEURAL
Status: COMPLETED | OUTPATIENT
Start: 2019-03-18 | End: 2019-03-18

## 2019-03-18 RX ORDER — MIDAZOLAM HYDROCHLORIDE 1 MG/ML
INJECTION INTRAMUSCULAR; INTRAVENOUS
Status: COMPLETED | OUTPATIENT
Start: 2019-03-18 | End: 2019-03-18

## 2019-03-18 RX ORDER — 0.9 % SODIUM CHLORIDE 0.9 %
VIAL (ML) INJECTION
Status: COMPLETED | OUTPATIENT
Start: 2019-03-18 | End: 2019-03-18

## 2019-03-18 ASSESSMENT — PAIN SCALES - GENERAL
PAINLEVEL_OUTOF10: 0
PAINLEVEL_OUTOF10: 0

## 2019-03-18 ASSESSMENT — PAIN DESCRIPTION - DESCRIPTORS: DESCRIPTORS: ACHING;DULL

## 2019-03-18 ASSESSMENT — PAIN - FUNCTIONAL ASSESSMENT: PAIN_FUNCTIONAL_ASSESSMENT: 0-10

## 2019-04-01 ENCOUNTER — OFFICE VISIT (OUTPATIENT)
Dept: ORTHOPEDIC SURGERY | Age: 39
End: 2019-04-01
Payer: COMMERCIAL

## 2019-04-01 VITALS — HEIGHT: 68 IN | WEIGHT: 149.91 LBS | BODY MASS INDEX: 22.72 KG/M2

## 2019-04-01 DIAGNOSIS — M50.20 HNP (HERNIATED NUCLEUS PULPOSUS), CERVICAL: Primary | ICD-10-CM

## 2019-04-01 DIAGNOSIS — M54.12 CERVICAL RADICULOPATHY: ICD-10-CM

## 2019-04-01 DIAGNOSIS — M43.12 SPONDYLOLISTHESIS OF CERVICAL REGION: ICD-10-CM

## 2019-04-01 PROCEDURE — G8428 CUR MEDS NOT DOCUMENT: HCPCS | Performed by: PHYSICAL MEDICINE & REHABILITATION

## 2019-04-01 PROCEDURE — G8420 CALC BMI NORM PARAMETERS: HCPCS | Performed by: PHYSICAL MEDICINE & REHABILITATION

## 2019-04-01 PROCEDURE — 1036F TOBACCO NON-USER: CPT | Performed by: PHYSICAL MEDICINE & REHABILITATION

## 2019-04-01 PROCEDURE — 99213 OFFICE O/P EST LOW 20 MIN: CPT | Performed by: PHYSICAL MEDICINE & REHABILITATION

## 2019-04-01 NOTE — PROGRESS NOTES
Follow up: Roxana Hemp  1980  [id number]      CHIEF COMPLAINT:    Chief Complaint   Patient presents with    Neck Pain     f/u C6-C7 [place] 3/18         HISTORY OF PRESENT ILLNESS:  Mr. Jaime Richmond is a 35 y.o. male returns for a follow up visit for multiple medical problems. His current presenting problems are   1. HNP (herniated nucleus pulposus), cervical    2. Cervical radiculopathy    3. Spondylolisthesis of cervical region    . As per information/history obtained from the PADT(patient assessment and documentation tool) - He complains of pain in the neck with radiation to the neck He rates the pain 1/10 and describes it as stiffness. Pain is made worse by: sleeping. He denies side effects from the current pain regimen. Patient reports that since the last follow up visit the physical functioning is better, family/social relationships are better, mood is better and sleep patterns are better, and that the overall functioning is better. Patient denies neurological bowel or bladder. Patient follows up today from injection of cervical spine. He states he got about 75% of relief. He states he can sleep better than before. Associated signs and symptoms:   Neurogenic bowel or bladder symptoms:  no   Perceived weakness:  no   Difficulty walking:  no              Past Medical History:   Past Medical History:   Diagnosis Date    Anterior cruciate ligament tear     right    Palpitations       Past Surgical History:     Past Surgical History:   Procedure Laterality Date    CERVICAL SPINE SURGERY N/A 3/18/2019    C6/C7 CERVICAL INTERLAMINAR EPIDURAL STEROID INJECTION WITH FLUOROSCOPY performed by Minoo Duffy MD [address]  Right 11/10/2016    RIGHT KNEE ARTHROSCOPIC ANTERIOR CRUCIATE LIGAMENT     Current Medications:   No current outpatient medications on file. Allergies:  Sulfa antibiotics  Social History:    reports that he has never smoked.  He has never used smokeless tobacco. He reports that he drinks about 0.6 oz of alcohol per week. He reports that he does not use drugs. Family History:   History reviewed. No pertinent family history. REVIEW OF SYSTEMS:   CONSTITUTIONAL: Denies unexplained weight loss, fevers, chills or fatigue  NEUROLOGICAL: Denies unsteady gait or progressive weakness  MUSCULOSKELETAL: Denies joint swelling or redness  GI: Denies nausea, vomiting, diarrhea   : Denies bowel or bladder issues       PHYSICAL EXAM:    Vitals: Height 5' 7.99\" (1.727 m), weight 149 lb 14.6 oz (68 kg). GENERAL EXAM:  · General Apparence: Patient is adequately groomed with no evidence of malnutrition. · Psychiatric: Orientation: The patient is oriented to time, place and person. The patient's mood and affect are appropriate   · Vascular: Examination reveals no swelling and palpation reveals no tenderness in upper or lower extremities. Good capillary refill. · The lymphatic examination of the neck, axillae and groin reveals all areas to be without enlargement or induration  · Sensation is intact without deficit in the upper and lower extremities to light touch and pinprick  · Coordination of the upper and lower extremities are normal.    CERVICAL EXAMINATION:  · Inspection: Local inspection shows no step-off or bruising. Cervical alignment is normal. No instability is noted. · Palpation and Percussion: No evidence of tenderness at the midline. Paraspinal tenderness is not present. There is no paraspinal spasm. Skin:There are no rashes, ulcerations or lesions  · Range of Motion:  pain-free ROM   · Strength: 5/5 bilateral upper extremities  · Special Tests:   Spurling's and Denton's are negative bilaterally. · Skin:There are no rashes, ulcerations or lesions in right & left upper extremities. · Reflexes: Bilaterally triceps, biceps and brachioradialis are 2+. Clonus absent bilaterally at the feet. No pathological reflexes are noted.   · Gait & station: normal, patient ambulates without assistance  · Additional Examinations:  · RIGHT UPPER EXTREMITY:  Inspection/examination of the right upper extremity does not show any tenderness, deformity or injury. Range of motion is unremarkable and pain-free. There is no gross instability. There are no rashes, ulcerations or lesions. Strength and tone are normal. No atrophy or abnormal movements are noted. · LEFT UPPER EXTREMITY: Inspection/examination of the left upper extremity does not show any tenderness, deformity or injury. Range of motion is unremarkable and pain-free. There is no gross instability. There are no rashes, ulcerations or lesions. Strength and tone are normal. No atrophy or abnormal movements are noted. · Additional Examinations:  · RIGHT LOWER EXTREMITY: Inspection/examination of the right lower extremity does not show any tenderness, deformity or injury. Range of motion is normal and pain-free. There is no gross instability. There are no rashes, ulcerations or lesions. Strength and tone are normal. No atrophy or abnormal movements are noted. · LEFT LOWER EXTREMITY:  Inspection/examination of the left lower extremity does not show any tenderness, deformity or injury. Range of motion is normal and pain-free. There is no gross instability. There are no rashes, ulcerations or lesions. Strength and tone are normal. No atrophy or abnormal movements are noted. Diagnostic Testing:    MR cervical spine shows 2/19/19  1. C4-5 trace retrolisthesis; shallow disc protrusion; without cord or nerve rootlet    distortion; without spinal or foraminal stenosis. 2. C5-6 trace posterior disc displacement effacing ventral dural sac, left of midline. Results for orders placed or performed during the hospital encounter of 11/10/16   POCT Glucose   Result Value Ref Range    POC Glucose 94 70 - 99 mg/dl    Performed on ACCU-CHEK      Impression:       1. HNP (herniated nucleus pulposus), cervical    2.  Cervical radiculopathy    3. Spondylolisthesis of cervical region        Plan:  Clinical Course: Above diagnoses are improving     I discussed the diagnosis and the treatment options with Cristhian Brady today. In Summary:  The various treatment options were outlined and discussed with Cristhian Brady including:  Conservative care options: physical therapy, ice, medications, bracing, and activity modification. The indications for therapeutic injections. The indications for additional imaging/laboratory studies. The indications for (possible future) interventions. After considering the various options discussed, Cristhian Brady elected to pursue a course of treatment that includes the followin. Medications:  Continue anti-inflammatories with appropriate GI Precautions including to stop if develop dark tarry stools or GI upset and to take with food. 2. PT:  Encouraged to continue with HEP. 3. Further studies:  No further studies. 4. Interventional:  75% improvement after NIMA    5. Follow up:  4-6 weeks      Cristhian Brady was instructed to call the office if his symptoms worsen or if new symptoms appear prior to the next scheduled visit. He is specifically instructed to contact the office between now & his scheduled appointment if he has concerns related to his condition or if he needs assistance in scheduling the above tests. He is welcome to call for an appointment sooner if he has any additional concerns or questions. I, Luis Chilel, am scribing for and in the presence of Dr. Gama Tijerina. 19 2:11 PM  Luis Chilel ATC, Marielena Postin, Dr. Leida Hood, personally performed the services described in this documentation as scribed by MEL Elizondo in my presence and it is both accurate and complete. Mae Schmidt.  Elie Ronquillo MD, [place], [facility]  Board Certified in [address] [place] [address]  Certified and Fellowship Trained in [facility] Ras Jacobson  Partner of [facility] ([facility])             This dictation was performed with a verbal recognition program [facility]) and it was checked for errors. It is possible that there are still dictated errors within this office note. If so, please bring any errors to my attention for an addendum. All efforts were made to ensure that this office note is accurate.

## 2021-01-14 ENCOUNTER — OFFICE VISIT (OUTPATIENT)
Dept: ORTHOPEDIC SURGERY | Age: 41
End: 2021-01-14
Payer: COMMERCIAL

## 2021-01-14 VITALS — TEMPERATURE: 96.9 F | BODY MASS INDEX: 22.72 KG/M2 | WEIGHT: 149.91 LBS | HEIGHT: 68 IN | RESPIRATION RATE: 14 BRPM

## 2021-01-14 DIAGNOSIS — M54.16 LUMBAR RADICULITIS: Primary | ICD-10-CM

## 2021-01-14 DIAGNOSIS — M51.26 HNP (HERNIATED NUCLEUS PULPOSUS), LUMBAR: ICD-10-CM

## 2021-01-14 PROCEDURE — 99214 OFFICE O/P EST MOD 30 MIN: CPT | Performed by: PHYSICAL MEDICINE & REHABILITATION

## 2021-01-14 PROCEDURE — G8484 FLU IMMUNIZE NO ADMIN: HCPCS | Performed by: PHYSICAL MEDICINE & REHABILITATION

## 2021-01-14 PROCEDURE — G8427 DOCREV CUR MEDS BY ELIG CLIN: HCPCS | Performed by: PHYSICAL MEDICINE & REHABILITATION

## 2021-01-14 PROCEDURE — 1036F TOBACCO NON-USER: CPT | Performed by: PHYSICAL MEDICINE & REHABILITATION

## 2021-01-14 PROCEDURE — G8420 CALC BMI NORM PARAMETERS: HCPCS | Performed by: PHYSICAL MEDICINE & REHABILITATION

## 2021-01-14 NOTE — PROGRESS NOTES
Follow up: Yamilet Gutierrez  1980  [id number]         Chief Complaint   Patient presents with    Lower Back Pain     CK LSP - no new BRYN. constant 1-2/10 pain. standing and morning time are bothersome. symptoms have been exacerbated the last 2 weeks. c/o instability and stiffnes in the AM time.  Other     5/21/2018 lov for LSP         HISTORY OF PRESENT ILLNESS:  Mr. Jessi Saonn is a 38 y.o. male returns for a follow up visit for multiple medical problems. His current presenting problems are   1. Lumbar radiculitis    2. HNP (herniated nucleus pulposus), lumbar    . As per information/history obtained from the PADT(patient assessment and documentation tool) - He complains of pain in the lower back with radiation to the lower leg Left He rates the pain 5/10 and describes it as throbbing. Pain is made worse by: getting out of bed. He denies side effects from the current pain regimen. Patient reports that since the last follow up visit the physical functioning is worse, family/social relationships are unchanged, mood is unchanged and sleep patterns are unchanged, and that the overall functioning is worse. Patient denies neurological bowel or bladder. He complains of worsening low back pain and occasional left leg pain. Bending makes him feel weak. MRI from 2018 shows L5-S1 disc herniation. Associated signs and symptoms:   Neurogenic bowel or bladder symptoms:  no   Perceived weakness:  no   Difficulty walking:  yes            Past medical, surgical, social and family history reviewed with the patient.  No pertinent relevant history  Past Medical History:   Past Medical History:   Diagnosis Date    Anterior cruciate ligament tear     right    Palpitations       Past Surgical History:     Past Surgical History:   Procedure Laterality Date    CERVICAL SPINE SURGERY N/A 3/18/2019    C6/C7 CERVICAL INTERLAMINAR EPIDURAL STEROID INJECTION WITH FLUOROSCOPY performed by Heavenly Dubois MD at [facility]    KNEE SURGERY Right 11/10/2016    RIGHT KNEE ARTHROSCOPIC ANTERIOR CRUCIATE LIGAMENT     Current Medications:     Current Outpatient Medications:     DICLOFENAC PO, Take by mouth as needed, Disp: , Rfl:   Allergies:  Sulfa antibiotics  Social History:    reports that he has never smoked. He has never used smokeless tobacco. He reports current alcohol use of about 1.0 standard drinks of alcohol per week. He reports that he does not use drugs. Family History:   Family History   Problem Relation Age of Onset    No Known Problems Mother     No Known Problems Father        REVIEW OF SYSTEMS:   CONSTITUTIONAL: Denies unexplained weight loss, fevers, chills or fatigue  NEUROLOGICAL: Denies unsteady gait or progressive weakness  MUSCULOSKELETAL: Denies joint swelling or redness  GI: Denies nausea, vomiting, diarrhea   : Denies bowel or bladder issues       PHYSICAL EXAM:    Vitals: Temperature 96.9 °F (36.1 °C), temperature source Infrared, resp. rate 14, height 5' 7.99\" (1.727 m), weight 149 lb 14.6 oz (68 kg). GENERAL EXAM:  · General Apparence: Patient is adequately groomed with no evidence of malnutrition. · Psychiatric: Orientation: The patient is oriented to time, place and person. The patient's mood and affect are appropriate   · Vascular: Examination reveals no swelling and palpation reveals no tenderness in upper or lower extremities. Good capillary refill. · The lymphatic examination of the neck, axillae and groin reveals all areas to be without enlargement or induration  · Sensation is intact without deficit in the upper and lower extremities to light touch and pinprick  · Coordination of the upper and lower extremities are normal.  · Additional Examinations:  · RIGHT UPPER EXTREMITY:  Inspection/examination of the right upper extremity does not show any tenderness, deformity or injury. Range of motion is unremarkable and pain-free. There is no gross instability.   There are no rashes, ulcerations or lesions. Strength and tone are normal. No atrophy or abnormal movements are noted. · LEFT UPPER EXTREMITY: Inspection/examination of the left upper extremity does not show any tenderness, deformity or injury. Range of motion is unremarkable and pain-free. There is no gross instability. There are no rashes, ulcerations or lesions. Strength and tone are normal. No atrophy or abnormal movements are noted. LUMBAR/SACRAL EXAMINATION:  · Inspection: Local inspection shows no step-off or bruising. Lumbar alignment is normal. No instability is noted. · Palpation:   No evidence of tenderness at the midline. Lumbar paraspinal tenderness: Mild L4/5 and L5/S1 tenderness  Bursal tenderness No tenderness bilaterally  There is no paraspinal spasm. · Range of Motion: limited by 25% in all planes due to pain  · Strength:   Strength testing is 5/5 in all muscle groups tested. · Special Tests:   Straight leg raise and crossed SLR negative. · Skin: There are no rashes, ulcerations or lesions. · Reflexes: Reflexes are symmetrically 2+ at the patellar and ankle tendons. Clonus absent bilaterally at the feet. · Gait & station: normal, patient ambulates without assistance and no ataxia  · Additional Examinations:  · RIGHT LOWER EXTREMITY: Inspection/examination of the right lower extremity does not show any tenderness, deformity or injury. Range of motion is normal and pain-free. There is no gross instability. There are no rashes, ulcerations or lesions. Strength and tone are normal. No atrophy or abnormal movements are noted. · LEFT LOWER EXTREMITY:  Inspection/examination of the left lower extremity does not show any tenderness, deformity or injury. Range of motion is normal and pain-free. There is no gross instability. There are no rashes, ulcerations or lesions. Strength and tone are normal. No atrophy or abnormal movements are noted.       Diagnostic Testing:    MR Lumbar spine shows  L5-S1 disc herniation  Results for orders placed or performed during the hospital encounter of 11/10/16   POCT Glucose   Result Value Ref Range    POC Glucose 94 70 - 99 mg/dl    Performed on ACCU-CHEK      Impression:       1. Lumbar radiculitis    2. HNP (herniated nucleus pulposus), lumbar        Plan:  Clinical Course: Above diagnoses are worsening    I discussed the diagnosis and the treatment options with Bubba Jacobs today. In Summary:  The various treatment options were outlined and discussed with Bubba Jacobs including:  Conservative care options: physical therapy, ice, medications, bracing, and activity modification. The indications for therapeutic injections. The indications for additional imaging/laboratory studies. The indications for (possible future) interventions. After considering the various options discussed, Bubba Jacobs elected to pursue a course of treatment that includes the followin. Medications:  Continue anti-inflammatories with appropriate GI Precautions including to stop if develop dark tarry stools or GI upset and to take with food. 2. PT:  Encouraged to continue with Home exercise program.    3. Further studies: No further studies. 4. Interventional:  We discussed pursuing a bilateral L5 TF epidural steroid injection to address the pain. Radiologic imaging and symptoms confirm the pain etiology. Risks, benefits and alternatives of interventional options were discussed. These include and are not limited to bleeding, infection, spinal headache, nerve injury and lack of pain relief. The patient verbalized understanding and would like to proceed. The patient will be scheduled accordingly. 5. Follow up:  2-3 weeks      Bubba Jacobs was instructed to call the office if his symptoms worsen or if new symptoms appear prior to the next scheduled visit.  He is specifically instructed to contact the office between now & his scheduled appointment if he has concerns related to his condition or if he needs assistance in scheduling the above tests. He is welcome to call for an appointment sooner if he has any additional concerns or questions. Jeanette Hurt. Lacretia Baumgarten, MD, MALDON[place], [facility]  Board Certified in [address] [place] [address] Certified and Fellowship Trained in [facility] ([facility])             This dictation was performed with a verbal recognition program [facility]) and it was checked for errors. It is possible that there are still dictated errors within this office note. If so, please bring any errors to my attention for an addendum. All efforts were made to ensure that this office note is accurate.

## 2021-01-18 ENCOUNTER — TELEPHONE (OUTPATIENT)
Dept: ORTHOPEDIC SURGERY | Age: 41
End: 2021-01-18

## 2021-01-18 NOTE — TELEPHONE ENCOUNTER
Auth: # [id number]    Date: 1/20/2021 thru 4/20/2021  Type of SX:  [employer]  Location: [place] [address]  CPT: 75598   DX Code: M54.16, M51.26  SX area: Lumbar spine  Insurance: [employer]    CPT: 50 MOD, [id number] [id number], [id number]  NPR

## 2023-09-25 ENCOUNTER — OFFICE VISIT (OUTPATIENT)
Dept: ORTHOPEDIC SURGERY | Age: 43
End: 2023-09-25

## 2023-09-25 ENCOUNTER — TELEPHONE (OUTPATIENT)
Dept: ORTHOPEDIC SURGERY | Age: 43
End: 2023-09-25

## 2023-09-25 VITALS — WEIGHT: 149 LBS | HEIGHT: 68 IN | BODY MASS INDEX: 22.58 KG/M2

## 2023-09-25 DIAGNOSIS — S86.112A GASTROCNEMIUS STRAIN, LEFT, INITIAL ENCOUNTER: ICD-10-CM

## 2023-09-25 DIAGNOSIS — M79.662 PAIN OF LEFT CALF: Primary | ICD-10-CM

## 2023-09-25 NOTE — PROGRESS NOTES
Chief Complaint:   Chief Complaint   Patient presents with    Leg Pain     Left lower leg pain- playing soccer yesterday, felt a sharp pain under calf muscle wasn't able to put much weight on it at the time          History of Present Illness:       Patient is a 39 y.o. male returns follow up for the above complaint. The symptoms began yesterday with an injury while playing soccer recreationally. He felt a sudden sharp pain in the calf and has been mildly disabled since that time. . The symptoms show no change since the last visit. The patient has had no further testing for the problem. Pain localizes to the medial calf and  is predictably aggravated by weightbearing. There are not mechanical symptoms associated with the symptoms. There are notneuritic symptoms involving the foot or ankle. The patient denies subjective instability about the foot or ankle and denies new onset or progressive weakness of the lower extremity. Pain level 3    The patient denies a pattern of activity related swelling. Treatment to date:none    There is no no prior history of foot or ankle trauma. There is no prior history of autoimmune disease, crystal arthropathy, or crystal arthropathy. Past Medical History:        Past Medical History:   Diagnosis Date    Anterior cruciate ligament tear     right    Palpitations         Present Medications:         Current Outpatient Medications   Medication Sig Dispense Refill    DICLOFENAC PO Take by mouth as needed       No current facility-administered medications for this visit. Allergies: Allergies   Allergen Reactions    Sulfa Antibiotics            Review of Systems:    Pertinent items are noted in HPI    Review of systems reviewed from Patient History Form dated on today's date and   available in the patient's chart under the Media tab. Vital Signs: There were no vitals filed for this visit.      General Exam:     Constitutional: Patient is

## 2023-10-04 ENCOUNTER — OFFICE VISIT (OUTPATIENT)
Dept: ORTHOPEDIC SURGERY | Age: 43
End: 2023-10-04

## 2023-10-04 VITALS — HEIGHT: 68 IN | WEIGHT: 149 LBS | BODY MASS INDEX: 22.58 KG/M2

## 2023-10-04 DIAGNOSIS — S86.112D GASTROCNEMIUS STRAIN, LEFT, SUBSEQUENT ENCOUNTER: Primary | ICD-10-CM

## 2023-10-04 DIAGNOSIS — M79.662 PAIN OF LEFT CALF: ICD-10-CM

## 2023-10-05 NOTE — PROGRESS NOTES
Chief Complaint:   Chief Complaint   Patient presents with    Follow-up     Left calf- feeling better, wears boot when able which seems to help, has noticed some fatigue and pulling sensation when walking and using stairs          History of Present Illness:       Patient is a 39 y.o. male returns follow up for the above complaint. The patient was last seen approximately 1 weeksago. The symptoms are improving since the last visit. The patient has had no further testing for the problem. He has transition from crutches and brace boot immobilization    Continues to localize pain to the medial calf pain levels 1-2/10    No new injuries no new events     Past Medical History:        Past Medical History:   Diagnosis Date    Anterior cruciate ligament tear     right    Palpitations         Present Medications:         Current Outpatient Medications   Medication Sig Dispense Refill    DICLOFENAC PO Take by mouth as needed       No current facility-administered medications for this visit. Allergies: Allergies   Allergen Reactions    Sulfa Antibiotics            Review of Systems:    Pertinent items are noted in HPI         Vital Signs: There were no vitals filed for this visit. General Exam:     Constitutional: Patient is adequately groomed with no evidence of malnutrition    Physical Exam:       Primary Exam:    Inspection: No deformity atrophy appreciable effusion      Palpation: No palpable rent or defect or palpable soft tissue hematoma      Range of Motion: Dorsiflexion passive 45 degrees low-grade discomfort      Strength: Submaximal isometric resisted plantarflexion low-grade discomfort medial gastroc      Special Tests: Caputo test negative      Skin: There are no rashes, ulcerations or lesions.       Gait: Minimally antalgic unprotected weightbearing      Neurovascular - non focal and intact       Additional Comments:        Additional Examinations:                Office Imaging

## 2023-10-10 ENCOUNTER — HOSPITAL ENCOUNTER (OUTPATIENT)
Dept: PHYSICAL THERAPY | Age: 43
Setting detail: THERAPIES SERIES
Discharge: HOME | End: 2023-10-10
Payer: COMMERCIAL

## 2023-10-10 DIAGNOSIS — S86.112D STRAIN OF GASTROCNEMIUS MUSCLE OF LEFT LOWER EXTREMITY, SUBSEQUENT ENCOUNTER: Primary | ICD-10-CM

## 2023-10-10 PROCEDURE — 97161 PT EVAL LOW COMPLEX 20 MIN: CPT | Performed by: PHYSICAL THERAPIST

## 2023-10-10 PROCEDURE — 97140 MANUAL THERAPY 1/> REGIONS: CPT | Performed by: PHYSICAL THERAPIST

## 2023-10-10 PROCEDURE — 97110 THERAPEUTIC EXERCISES: CPT | Performed by: PHYSICAL THERAPIST

## 2023-10-10 NOTE — FLOWSHEET NOTE
[address] [facility], [address], [address] office: [phone] fax: [phone]      Physical Therapy: TREATMENT/PROGRESS NOTE   Patient: Elise Tuttle (35 y.o. male)   Treatment Date: 10/10/2023   :  1980 MRN: [id number]   Visit #: 1   Insurance Allowable Auth Needed   [place] []Yes    []No    Insurance: Payor: Starr Raza / Plan: [address] / Product Type: *No Product type* /   Insurance ID: [id number] - (Commercial)  Secondary Insurance (if applicable):    Treatment Diagnosis:     ICD-10-CM    1. Strain of gastrocnemius muscle of left lower extremity, subsequent encounter  S86.112D          Medical Diagnosis:    Pain of left calf [V27.920]   Referring Physician: Summer Chaves  PCP: Saul Andrew MD                             Plan of care signed (Y/N):     Date of Patient follow up with Physician:      Progress Report/POC: EVAL today  POC update due: 2023 (OR 10 visits /OR Ashlie Campbell, whichever is less)    Latex Allergy:  [x]NO      []YES    Preferred Language for Healthcare:   [x]English       []other:    SUBJECTIVE EXAMINATION     Patient Report/Comments: see eval    OBJECTIVE EXAMINATION     Observation:     Test measurements: see eval     Test used Initial score  10/10/23 10/10/2023   Pain Summary VAS 1/10    Functional questionnaire LEFS 59/80  26% LOF    Other:              RESTRICTIONS/PRECAUTIONS: none    Exercises/Interventions:     Therapeutic Ex (62740)  resistance Sets/time Reps Notes/Cues/Progressions          Gastroc lunge stretch  30\" 3    Gastroc soleus stretch  30\" 3    Calf raise on R  3 10    Steamboats - flex, abd, ext green 2 15                                       Manual Intervention (09311)  TIME            IASTM  10'  Gastroc/soleus, achilles tendon                        NMR re-education (91288) resistance Sets/time Reps CUES NEEDED

## 2023-10-10 NOTE — PLAN OF CARE
ADLs and prior level of function  Status: [] Progressing: [] Met: [] Not Met: [] Adjusted  Patient will return to walking on uneven ground without increased symptoms or restriction to work towards return to prior level of function. Status: [] Progressing: [] Met: [] Not Met: [] Adjusted  Patient will return to jogging without increased symptoms. Status: [] Progressing: [] Met: [] Not Met: [] Adjusted    TREATMENT PLAN     Frequency/Duration: 1-2x/week for 8 weeks for the following treatment interventions:    Interventions:  [x] Therapeutic exercise including: strength training, ROM, including postural re-education. [x] NMR activation and proprioception, including postural re-education. [x] Manual therapy as indicated to include: PROM, Gr I-IV mobilizations, STM, and Dry Needling/IASTM  [x] Modalities as needed that may include: Cryotherapy  [x] Patient education on joint protection, postural re-education, activity modification, progression of HEP. HEP instruction: Refer to HEP instructions outlined on the flowsheet on 10/10/2023.     Electronically Signed by Brayan Smith PT  Date: 10/10/2023

## 2023-10-19 ENCOUNTER — APPOINTMENT (OUTPATIENT)
Dept: PHYSICAL THERAPY | Age: 43
End: 2023-10-19
Payer: COMMERCIAL

## 2023-10-23 ENCOUNTER — HOSPITAL ENCOUNTER (OUTPATIENT)
Dept: PHYSICAL THERAPY | Age: 43
Setting detail: THERAPIES SERIES
Discharge: HOME | End: 2023-10-23
Payer: COMMERCIAL

## 2023-10-23 PROCEDURE — 97110 THERAPEUTIC EXERCISES: CPT | Performed by: PHYSICAL THERAPIST

## 2023-10-23 PROCEDURE — 97140 MANUAL THERAPY 1/> REGIONS: CPT | Performed by: PHYSICAL THERAPIST

## 2023-10-23 NOTE — FLOWSHEET NOTE
[address] [address] [facility], [address], [address] office: [phone] fax: [phone]      Physical Therapy: TREATMENT/PROGRESS NOTE   Patient: Gonzalo Chavarria (54 y.o. male)   Treatment Date: 10/23/2023   :  1980 MRN: [id number]   Visit #: 2   Insurance Allowable Auth Needed   MN []Yes    []No    Insurance: Payor: Sofiya Stringer / Plan: [address],[place] / Product Type: *No Product type* /   Insurance ID: [id number] - (Commercial)  Secondary Insurance (if applicable):    Treatment Diagnosis:     ICD-10-CM    1. Strain of gastrocnemius muscle of left lower extremity, subsequent encounter  S86.112D          Medical Diagnosis:    Pain of left calf [M64.389]   Referring Physician: Chucho Rich  PCP: Kristal Gilliam MD                             Plan of care signed (Y/N):     Date of Patient follow up with Physician:      Progress Report/POC: NO  POC update due: 2023 (OR 10 visits /OR Thom Antes, whichever is less)    Latex Allergy:  [x]NO      []YES    Preferred Language for Healthcare:   [x]English       []other:    SUBJECTIVE EXAMINATION     Patient Report/Comments: Pt. Reports that his ankle is feeling better overall. Pt. Notes that he continues to have some stiffness in the calf and some soreness in anterior knee with increased walking.      OBJECTIVE EXAMINATION     Observation:     Test measurements: see eval     Test used Initial score  10/10/23 10/23/2023   Pain Summary VAS 1/10 0/10   Functional questionnaire LEFS 59/80  26% LOF    Other:              RESTRICTIONS/PRECAUTIONS: none    Exercises/Interventions:     Therapeutic Ex (54106)  resistance Sets/time Reps Notes/Cues/Progressions          Gastroc lunge stretch  30\" 3    Gastroc soleus stretch  30\" 3    Calf raise on 2FR  3 10    Steamboats - flex, abd, ext green 2 15    SLR+  30 3ea    Leg press SL ecc 100# 2 15

## 2023-10-30 ENCOUNTER — HOSPITAL ENCOUNTER (OUTPATIENT)
Dept: PHYSICAL THERAPY | Age: 43
Setting detail: THERAPIES SERIES
Discharge: HOME | End: 2023-10-30
Payer: COMMERCIAL

## 2023-10-30 PROCEDURE — 97110 THERAPEUTIC EXERCISES: CPT | Performed by: PHYSICAL THERAPIST

## 2023-10-30 PROCEDURE — 97140 MANUAL THERAPY 1/> REGIONS: CPT | Performed by: PHYSICAL THERAPIST

## 2023-10-30 PROCEDURE — 97530 THERAPEUTIC ACTIVITIES: CPT | Performed by: PHYSICAL THERAPIST

## 2023-10-30 NOTE — FLOWSHEET NOTE
by syed  [] Patient limited by pain  [] Patient limited by other medical complications  [] Other:     Return to Play: NA    Prognosis for POC: [x] Good [] Fair  [] Poor    Patient requires continued skilled intervention: [x] Yes  [] No      GOALS     Patient stated goal: return to running and hiking without pain  Status: [] Progressing: [] Met: [] Not Met: [] Adjusted    Therapist goals for Patient:   Short Term Goals: To be achieved in: 2 weeks  Independent in HEP and progression per patient tolerance, in order to progress toward full function and prevent re-injury. Status: [] Progressing: [] Met: [] Not Met: [] Adjusted  Patient will have a decrease in pain to 1/10 to help  facilitate improvement in movement, function, and ADLs as indicated by functional deficits. Status: [] Progressing: [] Met: [] Not Met: [] Adjusted    Long Term Goals: To be achieved in: 8 weeks  Disability index score of 15% or less for the LEFS to assist with return top prior level of function. Status: [] Progressing: [] Met: [] Not Met: [] Adjusted  Improve  ankle AROM  DF to 10 degrees or  better to allow for proper joint functioning as indicated by patients functional deficits. Status: [] Progressing: [] Met: [] Not Met: [] Adjusted  Pt to improve strength to 4+/5 or better of gastroc/soleusto allow for proper muscle and joint use in functional mobility, ADLs and prior level of function  Status: [] Progressing: [] Met: [] Not Met: [] Adjusted  Patient will return to walking on uneven ground without increased symptoms or restriction to work towards return to prior level of function. Status: [] Progressing: [] Met: [] Not Met: [] Adjusted  Patient will return to jogging without increased symptoms. Status: [] Progressing: [] Met: [] Not Met: [] Adjusted    Overall Progression Towards Functional goals/ Treatment Progress Update:  [] Patient is progressing as expected towards functional goals listed.     []

## 2023-11-01 ENCOUNTER — OFFICE VISIT (OUTPATIENT)
Dept: ORTHOPEDIC SURGERY | Age: 43
End: 2023-11-01

## 2023-11-01 VITALS — WEIGHT: 148.9 LBS | BODY MASS INDEX: 22.65 KG/M2

## 2023-11-01 DIAGNOSIS — S86.019A STRAIN OF ACHILLES TENDON, INITIAL ENCOUNTER: ICD-10-CM

## 2023-11-01 DIAGNOSIS — S86.112D GASTROCNEMIUS STRAIN, LEFT, SUBSEQUENT ENCOUNTER: Primary | ICD-10-CM

## 2023-11-01 NOTE — PROGRESS NOTES
Chief Complaint:   Chief Complaint   Patient presents with    Leg Pain     F/U L CALF. Calf is doing much better, not completely gone though. Now having quite a bit of pain more towards his ankle, inferior to the pain he originally had. History of Present Illness:       Patient is a 39 y.o. male returns follow up for the above complaint. The patient was last seen approximately 1 monthsago. The symptoms are improving since the last visit. The patient has had no further testing for the problem. Overall 75% improved with ADLs    Pain levels 0.5    Symptoms of pain localized distally to the Achilles tendon region which is a change from previous    No new injuries no new events    He continues with intermittent supervised sessions of PT    He has not yet returned to sport     Past Medical History:        Past Medical History:   Diagnosis Date    Anterior cruciate ligament tear     right    Palpitations         Present Medications:         Current Outpatient Medications   Medication Sig Dispense Refill    diclofenac sodium (VOLTAREN) 1 % GEL Apply 2 to 4 g 2 times daily for 2 weeks then twice daily as needed thereafter 150 g 3    DICLOFENAC PO Take by mouth as needed       No current facility-administered medications for this visit. Allergies: Allergies   Allergen Reactions    Sulfa Antibiotics            Review of Systems:    Pertinent items are noted in HPI      Vital Signs: There were no vitals filed for this visit.      General Exam:     Constitutional: Patient is adequately groomed with no evidence of malnutrition    Physical Exam: left leg      Primary Exam:    Inspection: No deformity atrophy appreciable effusion      Palpation: There is tenderness over the Achilles tendon noninsertional no focal tenderness over the gastrocnemius      Range of Motion: Dorsiflexion 45 degrees      Strength: Single heel rise near normal low-grade discomfort      Special Tests: Negative SLR      Skin:

## 2023-11-09 ENCOUNTER — HOSPITAL ENCOUNTER (OUTPATIENT)
Dept: PHYSICAL THERAPY | Age: 43
Setting detail: THERAPIES SERIES
Discharge: HOME | End: 2023-11-09
Payer: COMMERCIAL

## 2023-11-09 PROCEDURE — 97530 THERAPEUTIC ACTIVITIES: CPT | Performed by: PHYSICAL THERAPIST

## 2023-11-09 PROCEDURE — 97140 MANUAL THERAPY 1/> REGIONS: CPT | Performed by: PHYSICAL THERAPIST

## 2023-11-09 PROCEDURE — 97110 THERAPEUTIC EXERCISES: CPT | Performed by: PHYSICAL THERAPIST

## 2023-11-09 NOTE — PLAN OF CARE
of dynamic activities to improve functional performance. (Ex include squatting, ascending/descending stairs, walking, bending, lifting, catching, throwing, pushing, pulling, jumping.)  Direct, one on one contact, billed in 15-minute increments. (76184) MANUAL THERAPY-  Manual therapy techniques, 1 or more regions, each 15 minutes (Mobilization/manipulation, manual lymphatic drainage, manual traction) for the purpose of modulating pain, promoting relaxation,  increasing ROM, reducing/eliminating soft tissue swelling/inflammation/restriction, improving soft tissue extensibility and allowing for proper ROM for normal function with self care, mobility, lifting and ambulation    TREATMENT PLAN   Plan: Cont POC- Continue emphasis/focus on exercise progression. Next visit plan to continue current phase     Electronically Signed by Dar Heredia PT              Date: 11/09/2023     Note: If patient does not return for scheduled/recommended follow up visits, this note will serve as a discharge from care along with the most recent update on progress.

## 2023-11-15 ENCOUNTER — HOSPITAL ENCOUNTER (OUTPATIENT)
Dept: PHYSICAL THERAPY | Age: 43
Setting detail: THERAPIES SERIES
Discharge: HOME | End: 2023-11-15
Payer: COMMERCIAL

## 2023-11-15 PROCEDURE — 97140 MANUAL THERAPY 1/> REGIONS: CPT | Performed by: SPECIALIST/TECHNOLOGIST

## 2023-11-15 PROCEDURE — 97530 THERAPEUTIC ACTIVITIES: CPT | Performed by: SPECIALIST/TECHNOLOGIST

## 2023-11-15 PROCEDURE — 97110 THERAPEUTIC EXERCISES: CPT | Performed by: SPECIALIST/TECHNOLOGIST

## 2023-11-15 NOTE — FLOWSHEET NOTE
[address] [address], [address] [facility], [address], [address] office: [phone] fax: [phone]      Physical Therapy: TREATMENT/PROGRESS NOTE   Patient: Joaquín Zuñiga (48 y.o. male)   Treatment Date: 11/15/2023   :  1980 MRN: [id number]   Visit #: 5   Insurance Allowable Auth Needed   [place] []Yes    []No    Insurance: Payor: Angie Vieyra / Plan: [address] ,[facility] A / Product Type: *No Product type* /   Insurance ID: [id number] - (Commercial)  Secondary Insurance (if applicable):    Treatment Diagnosis:     ICD-10-CM    1. Strain of gastrocnemius muscle of left lower extremity, subsequent encounter  S86.112D          Medical Diagnosis:    Pain of left calf [X36.621]   Referring Physician: Brian Rivas  PCP: Taya Garcia MD                             Plan of care signed (Y/N):     Date of Patient follow up with Physician:      Progress Report/POC: YES and Date Range for this report: 10/10/23-23  POC update due: 2023 (OR 10 visits /OR [address], whichever is less)    Latex Allergy:  [x]NO      []YES    Preferred Language for Healthcare:   [x]English       []other:    SUBJECTIVE EXAMINATION     Patient Report/Comments: Pt reports having some gastroc. tightness in the am hours for unknown reason. Unable to attempt to jog since last session. Left achilles is the biggest issue, IASTM seems to help. Medial achilles discomfort on left foot.    OBJECTIVE EXAMINATION     Observation:   11/15 Left foot pronated position at rest    Test measurements:     Test used Initial score  10/10/23 2023 11/15/2023     Pain Summary VAS 1/10 010 010   Functional questionnaire LEFS 59/80  26% LOF 63/80  21% LOF    Other:                        AROM L AROM R Notes                  ANKLE Dorsiflexion 58       Plantarflexion 8       Inversion 24       Eversion 20                MMT L MMT R Notes         HIP Flexion

## 2023-11-21 ENCOUNTER — HOSPITAL ENCOUNTER (OUTPATIENT)
Dept: PHYSICAL THERAPY | Age: 43
Setting detail: THERAPIES SERIES
Discharge: HOME | End: 2023-11-21
Payer: COMMERCIAL

## 2023-11-21 PROCEDURE — 97110 THERAPEUTIC EXERCISES: CPT | Performed by: PHYSICAL THERAPIST

## 2023-11-21 PROCEDURE — 97530 THERAPEUTIC ACTIVITIES: CPT | Performed by: PHYSICAL THERAPIST

## 2023-11-21 NOTE — FLOWSHEET NOTE
[address] [facility], [address], [address] office: [phone] fax: [phone]      Physical Therapy: TREATMENT/PROGRESS NOTE   Patient: Joyce Lindsay (61 y.o. male)   Treatment Date: 2023   :  1980 MRN: [id number]   Visit #: 6   Insurance Allowable Auth Needed   MN []Yes    []No    Insurance: Payor: William Kaya / Plan: [address],[place] / Product Type: *No Product type* /   Insurance ID: [id number] - (Commercial)  Secondary Insurance (if applicable):    Treatment Diagnosis:     ICD-10-CM    1. Strain of gastrocnemius muscle of left lower extremity, subsequent encounter  S86.112D          Medical Diagnosis:    Pain of left calf [A85.561]   Referring Physician: Zara Traylor  PCP: Elton Merino MD                             Plan of care signed (Y/N):     Date of Patient follow up with Physician:      Progress Report/POC: NO  POC update due: 2023 (OR 10 visits /OR [place], whichever is less)    Latex Allergy:  [x]NO      []YES    Preferred Language for Healthcare:   [x]English       []other:    SUBJECTIVE EXAMINATION     Patient Report/Comments: pt. States that his ankle is feeling good and he does not have pain at this time. Pt. Feels that the calf stiffness in the morning has dissipated.      OBJECTIVE EXAMINATION     Observation:   11/15 Left foot pronated position at rest    Test measurements:     Test used Initial score  10/10/23 2023 2023     Pain Summary VAS 1/10 010 010   Functional questionnaire LEFS 59/80  26% LOF 63/80  21% LOF    Other:                        AROM L AROM R Notes                  ANKLE Dorsiflexion 58       Plantarflexion 8       Inversion 24       Eversion 20                MMT L MMT R Notes         HIP Flexion 4+ 4+      Abduction 4 4      ER          IR          Extension                     KNEE Flexion 4+ 4+      Extension 4+

## 2023-11-28 ENCOUNTER — HOSPITAL ENCOUNTER (OUTPATIENT)
Dept: PHYSICAL THERAPY | Age: 43
Setting detail: THERAPIES SERIES
Discharge: HOME | End: 2023-11-28
Payer: COMMERCIAL

## 2023-11-28 PROCEDURE — 97110 THERAPEUTIC EXERCISES: CPT | Performed by: PHYSICAL THERAPIST

## 2023-11-28 PROCEDURE — 97530 THERAPEUTIC ACTIVITIES: CPT

## 2023-11-28 PROCEDURE — 97530 THERAPEUTIC ACTIVITIES: CPT | Performed by: PHYSICAL THERAPIST

## 2023-11-28 NOTE — FLOWSHEET NOTE
[address] [address] [facility], [address], [address] office: [phone] fax: [phone]      Physical Therapy: TREATMENT/PROGRESS NOTE   Patient: Nikkie Costa (56 y.o. male)   Treatment Date: 2023   :  1980 MRN: [id number]   Visit #: 7   Insurance Allowable Auth Needed   MN []Yes    []No    Insurance: Payor: Teresa Apo / Plan: [address] A / Product Type: *No Product type* /   Insurance ID: [id number] - (Commercial)  Secondary Insurance (if applicable):    Treatment Diagnosis:     ICD-10-CM    1. Strain of gastrocnemius muscle of left lower extremity, subsequent encounter  S86.112D          Medical Diagnosis:    Pain of left calf [L07.800]   Referring Physician: Divya Odell  PCP: Jonathon Quigley MD                             Plan of care signed (Y/N):     Date of Patient follow up with Physician:      Progress Report/POC: NO  POC update due: 2023 (OR 10 visits /OR [address], whichever is less)    Latex Allergy:  [x]NO      []YES    Preferred Language for Healthcare:   [x]English       []other:    SUBJECTIVE EXAMINATION     Patient Report/Comments: pt. Reports that his calf and ankles are feeling good. He does not have pain at this time. Pt. States that he started to try to jog a little but felt really stiff in his ankles so he did not run further.      OBJECTIVE EXAMINATION     Observation:   11/15 Left foot pronated position at rest    Test measurements:     Test used Initial score  10/10/23 2023 2023     Pain Summary VAS 1/10 0/10 010   Functional questionnaire LEFS 59/80  26% LOF 63/80  21% LOF    Other:                        AROM L AROM R Notes                  ANKLE Dorsiflexion 58       Plantarflexion 8       Inversion 24       Eversion 20                MMT L MMT R Notes         HIP Flexion 4+ 4+      Abduction 4 4      ER          IR          Extension

## 2024-08-13 ENCOUNTER — OFFICE VISIT (OUTPATIENT)
Dept: ORTHOPEDIC SURGERY | Age: 44
End: 2024-08-13

## 2024-08-13 VITALS — HEIGHT: 67 IN | WEIGHT: 148 LBS | BODY MASS INDEX: 23.23 KG/M2

## 2024-08-13 DIAGNOSIS — S86.911A KNEE STRAIN, RIGHT, INITIAL ENCOUNTER: ICD-10-CM

## 2024-08-13 DIAGNOSIS — Z98.890 HX OF ANTERIOR CRUCIATE LIGAMENT TEAR RECONSTRUCTION: ICD-10-CM

## 2024-08-13 DIAGNOSIS — M25.561 RIGHT KNEE PAIN, UNSPECIFIED CHRONICITY: Primary | ICD-10-CM

## 2024-08-13 RX ORDER — ALBUTEROL SULFATE 90 UG/1
2-4 AEROSOL, METERED RESPIRATORY (INHALATION) EVERY 4 HOURS PRN
COMMUNITY
Start: 2024-01-26

## 2024-08-13 NOTE — PROGRESS NOTES
Chief Complaint:   Chief Complaint   Patient presents with    Knee Pain     RIGHT KNEE-Felt a jolt while playing soccer 2 to 3 weeks ago.          History of Present Illness:       Patient is a 44 y.o. male presents with the above complaint. The symptoms began 3 weeksago and started with an  event related to kicking after  a soccer ball having just completed a game.  He felt a \"jolt\" of pain within the knee related to the kick.  Symptoms have stabilized but remain problematic.  He has returned to active play without limitation past histories significant for left ACL allograft reconstruction 2016.     The patient describes a pressure pain that does not radiate.  The symptoms are intermittent  and are cycling since the onset.      Pain localizes to the medial infrapatellar and  does not seems to follow a typical patella femoral provacative pattern.  There are not mechanical symptoms that suggest meniscal injury.  The patient denies subjective instability about the knee and denies new onset weakness of the lower extremity.    Pain level  0-3    The patient denies a pattern of activity related swelling.      Treatment to date: Ice.     Workup has included  none    There is no prior history of autoimmune disease, inflammatory arthropathy, or crystal arthropathy.                 Past Medical History:        Past Medical History:   Diagnosis Date    Anterior cruciate ligament tear     right    Palpitations          Past Surgical History:   Procedure Laterality Date    CERVICAL SPINE SURGERY N/A 3/18/2019    C6/C7 CERVICAL INTERLAMINAR EPIDURAL STEROID INJECTION WITH FLUOROSCOPY performed by Nanci Hood MD at [facility]    KNEE SURGERY Right 11/10/2016    RIGHT KNEE ARTHROSCOPIC ANTERIOR CRUCIATE LIGAMENT         Present Medications:         Current Outpatient Medications   Medication Sig Dispense Refill    albuterol sulfate HFA (PROVENTIL;VENTOLIN;PROAIR) 108 (90 Base) MCG/ACT inhaler Inhale 2-4 puffs into the lungs

## (undated) DEVICE — STERILE POLYISOPRENE POWDER-FREE SURGICAL GLOVES: Brand: PROTEXIS

## (undated) DEVICE — STANDARD HYPODERMIC NEEDLE,POLYPROPYLENE HUB: Brand: MONOJECT

## (undated) DEVICE — DISPOSABLE OR TOWEL: Brand: CARDINAL HEALTH

## (undated) DEVICE — Device: Brand: JELCO

## (undated) DEVICE — PORT VLV 2 W NDL FREE SMRTSITE

## (undated) DEVICE — UNIVERSAL BLOCK TRAY: Brand: AVANOS*

## (undated) DEVICE — SYRINGE MED 3ML CLR PLAS STD N CTRL LUERLOCK TIP DISP

## (undated) DEVICE — PEN: MARKING STD 100/CS: Brand: MEDICAL ACTION INDUSTRIES

## (undated) DEVICE — NEEDLE EPI L3.5IN OD20GA CLR POLYCARB HUB WNG N DEHP TUOHY

## (undated) DEVICE — CHLORAPREP 26ML ORANGE

## (undated) DEVICE — MEDIA CONTRAST RX ISOVUE-300 61% 30ML VIALS